# Patient Record
Sex: FEMALE | Race: WHITE | Employment: FULL TIME | URBAN - METROPOLITAN AREA
[De-identification: names, ages, dates, MRNs, and addresses within clinical notes are randomized per-mention and may not be internally consistent; named-entity substitution may affect disease eponyms.]

---

## 2017-01-30 ENCOUNTER — OFFICE VISIT (OUTPATIENT)
Dept: FAMILY MEDICINE CLINIC | Age: 27
End: 2017-01-30

## 2017-01-30 VITALS
HEIGHT: 64 IN | RESPIRATION RATE: 18 BRPM | HEART RATE: 89 BPM | DIASTOLIC BLOOD PRESSURE: 60 MMHG | WEIGHT: 237 LBS | SYSTOLIC BLOOD PRESSURE: 118 MMHG | OXYGEN SATURATION: 95 % | BODY MASS INDEX: 40.46 KG/M2

## 2017-01-30 DIAGNOSIS — Z13.1 SCREENING FOR DIABETES MELLITUS: ICD-10-CM

## 2017-01-30 DIAGNOSIS — Z00.00 WELL ADULT EXAM: Primary | ICD-10-CM

## 2017-01-30 DIAGNOSIS — E03.8 HYPOTHYROIDISM DUE TO HASHIMOTO'S THYROIDITIS: ICD-10-CM

## 2017-01-30 DIAGNOSIS — E78.2 MIXED HYPERLIPIDEMIA: ICD-10-CM

## 2017-01-30 DIAGNOSIS — E06.3 HYPOTHYROIDISM DUE TO HASHIMOTO'S THYROIDITIS: ICD-10-CM

## 2017-01-30 DIAGNOSIS — Z11.4 SCREENING FOR HIV WITHOUT PRESENCE OF RISK FACTORS: ICD-10-CM

## 2017-01-30 PROCEDURE — 99395 PREV VISIT EST AGE 18-39: CPT | Performed by: FAMILY MEDICINE

## 2017-01-30 RX ORDER — LEVOTHYROXINE SODIUM 0.1 MG/1
100 TABLET ORAL DAILY
Qty: 90 TABLET | Refills: 3 | Status: SHIPPED | OUTPATIENT
Start: 2017-01-30 | End: 2018-01-18 | Stop reason: SDUPTHER

## 2017-01-30 ASSESSMENT — PATIENT HEALTH QUESTIONNAIRE - PHQ9
1. LITTLE INTEREST OR PLEASURE IN DOING THINGS: 0
SUM OF ALL RESPONSES TO PHQ QUESTIONS 1-9: 0
SUM OF ALL RESPONSES TO PHQ9 QUESTIONS 1 & 2: 0
2. FEELING DOWN, DEPRESSED OR HOPELESS: 0

## 2017-03-23 ENCOUNTER — HOSPITAL ENCOUNTER (OUTPATIENT)
Dept: GENERAL RADIOLOGY | Age: 27
Discharge: HOME | End: 2017-03-23
Attending: OBSTETRICS & GYNECOLOGY | Admitting: OBSTETRICS & GYNECOLOGY

## 2017-03-23 LAB — GONADOTROPIN, CHORIONIC (HCG) QUANT: 818.6 UIU/ML

## 2017-03-27 ENCOUNTER — HOSPITAL ENCOUNTER (OUTPATIENT)
Dept: LAB | Age: 27
Discharge: HOME | End: 2017-03-27
Attending: OBSTETRICS & GYNECOLOGY | Admitting: OBSTETRICS & GYNECOLOGY

## 2017-03-27 LAB — HCG QUALITATIVE: POSITIVE

## 2017-03-28 ENCOUNTER — HOSPITAL ENCOUNTER (OUTPATIENT)
Dept: LAB | Age: 27
Discharge: HOME | End: 2017-03-28
Attending: OBSTETRICS & GYNECOLOGY | Admitting: OBSTETRICS & GYNECOLOGY

## 2017-03-28 LAB — GONADOTROPIN, CHORIONIC (HCG) QUANT: 1168 UIU/ML

## 2017-03-30 ENCOUNTER — HOSPITAL ENCOUNTER (OUTPATIENT)
Dept: LAB | Age: 27
Discharge: HOME | End: 2017-03-30
Attending: OBSTETRICS & GYNECOLOGY | Admitting: OBSTETRICS & GYNECOLOGY

## 2017-03-30 LAB — GONADOTROPIN, CHORIONIC (HCG) QUANT: 1209 UIU/ML

## 2017-05-15 DIAGNOSIS — E03.8 HYPOTHYROIDISM DUE TO HASHIMOTO'S THYROIDITIS: ICD-10-CM

## 2017-05-15 DIAGNOSIS — E06.3 HYPOTHYROIDISM DUE TO HASHIMOTO'S THYROIDITIS: ICD-10-CM

## 2017-05-16 RX ORDER — LEVOTHYROXINE SODIUM 100 MCG
TABLET ORAL
Qty: 90 TABLET | Refills: 1 | Status: SHIPPED | OUTPATIENT
Start: 2017-05-16 | End: 2017-11-07 | Stop reason: ALTCHOICE

## 2017-06-13 ENCOUNTER — HOSPITAL ENCOUNTER (OUTPATIENT)
Dept: LAB | Age: 27
Discharge: HOME | End: 2017-06-13
Attending: FAMILY MEDICINE | Admitting: FAMILY MEDICINE

## 2017-06-13 LAB
CHOLESTEROL: 247 MG/DL
GLUCOSE BLD-MCNC: 106 MG/DL (ref 70–140)
HDLC SERPL-MCNC: 46 MG/DL
LDL CHOLESTEROL CALCULATED: 164 MG/DL
TRIGL SERPL-MCNC: 185 MG/DL

## 2017-08-15 ENCOUNTER — TELEPHONE (OUTPATIENT)
Dept: FAMILY MEDICINE CLINIC | Age: 27
End: 2017-08-15

## 2017-08-15 DIAGNOSIS — E03.8 HYPOTHYROIDISM DUE TO HASHIMOTO'S THYROIDITIS: Primary | ICD-10-CM

## 2017-08-15 DIAGNOSIS — E06.3 HYPOTHYROIDISM DUE TO HASHIMOTO'S THYROIDITIS: Primary | ICD-10-CM

## 2017-08-16 ENCOUNTER — HOSPITAL ENCOUNTER (OUTPATIENT)
Dept: LAB | Age: 27
Discharge: HOME | End: 2017-08-16
Attending: FAMILY MEDICINE | Admitting: FAMILY MEDICINE

## 2017-08-16 LAB
HCT VFR BLD CALC: 39.7 % (ref 37–47)
HEMOGLOBIN: 12.4 GM/DL (ref 12.5–16)
MCH RBC QN AUTO: 26.2 PG (ref 27–31)
MCHC RBC AUTO-ENTMCNC: 31.2 % (ref 32–36)
MCV RBC AUTO: 83.9 FL (ref 78–100)
PDW BLD-RTO: 14.1 % (ref 11.7–14.9)
PLATELET # BLD: 429 K/CU MM (ref 140–440)
PMV BLD AUTO: 9.7 FL (ref 7.5–11.1)
RBC # BLD: 4.73 M/CU MM (ref 4.2–5.4)
TSH HIGH SENSITIVITY: 2.08 UIU/ML (ref 0.27–4.2)
WBC # BLD: 12.2 K/CU MM (ref 4–10.5)

## 2017-08-21 ENCOUNTER — OFFICE VISIT (OUTPATIENT)
Dept: FAMILY MEDICINE CLINIC | Age: 27
End: 2017-08-21

## 2017-08-21 VITALS
DIASTOLIC BLOOD PRESSURE: 70 MMHG | WEIGHT: 249.2 LBS | HEIGHT: 64 IN | HEART RATE: 87 BPM | OXYGEN SATURATION: 98 % | BODY MASS INDEX: 42.55 KG/M2 | SYSTOLIC BLOOD PRESSURE: 116 MMHG

## 2017-08-21 DIAGNOSIS — D72.829 LEUKOCYTOSIS, UNSPECIFIED TYPE: ICD-10-CM

## 2017-08-21 DIAGNOSIS — M25.50 ARTHRALGIA, UNSPECIFIED JOINT: ICD-10-CM

## 2017-08-21 DIAGNOSIS — D50.0 IRON DEFICIENCY ANEMIA DUE TO CHRONIC BLOOD LOSS: ICD-10-CM

## 2017-08-21 DIAGNOSIS — J01.01 ACUTE RECURRENT MAXILLARY SINUSITIS: Primary | ICD-10-CM

## 2017-08-21 PROCEDURE — 99214 OFFICE O/P EST MOD 30 MIN: CPT | Performed by: FAMILY MEDICINE

## 2017-08-21 RX ORDER — METOPROLOL SUCCINATE 50 MG/1
50 TABLET, EXTENDED RELEASE ORAL 2 TIMES DAILY
COMMUNITY
End: 2017-11-07 | Stop reason: ALTCHOICE

## 2017-08-21 RX ORDER — METOPROLOL SUCCINATE 25 MG/1
25 TABLET, EXTENDED RELEASE ORAL DAILY PRN
COMMUNITY
End: 2017-11-07 | Stop reason: ALTCHOICE

## 2017-08-25 ENCOUNTER — HOSPITAL ENCOUNTER (OUTPATIENT)
Dept: LAB | Age: 27
Discharge: HOME | End: 2017-08-25
Attending: INTERNAL MEDICINE | Admitting: INTERNAL MEDICINE

## 2017-08-25 LAB
ALBUMIN SERPL-MCNC: 4.3 GM/DL (ref 3.4–5)
ALP BLD-CCNC: 84 IU/L (ref 40–129)
ALT SERPL-CCNC: 22 U/L (ref 10–40)
ANION GAP SERPL CALCULATED.3IONS-SCNC: 13 MMOL/L (ref 4–16)
AST SERPL-CCNC: 17 IU/L (ref 15–37)
BASOPHILS ABSOLUTE: 0.1 K/CU MM
BASOPHILS RELATIVE PERCENT: 0.6 % (ref 0–1)
BILIRUB SERPL-MCNC: 0.1 MG/DL (ref 0–1)
BUN BLDV-MCNC: 11 MG/DL (ref 6–23)
CALCIUM SERPL-MCNC: 9.4 MG/DL (ref 8.3–10.6)
CHLORIDE BLD-SCNC: 99 MMOL/L (ref 99–110)
CO2: 26 MMOL/L (ref 21–32)
CREAT SERPL-MCNC: 0.6 MG/DL (ref 0.6–1.1)
DIFFERENTIAL TYPE: ABNORMAL
EOSINOPHILS ABSOLUTE: 0.3 K/CU MM
EOSINOPHILS RELATIVE PERCENT: 2.6 % (ref 0–3)
ERYTHROCYTE SEDIMENTATION RATE: 34 MM/HR (ref 0–20)
FOLATE: 17.1 NG/ML (ref 3.1–17.5)
GFR AFRICAN AMERICAN: >60 ML/MIN/1.73M2
GFR NON-AFRICAN AMERICAN: >60 ML/MIN/1.73M2
GLUCOSE BLD-MCNC: 110 MG/DL (ref 70–140)
HCT VFR BLD CALC: 38 % (ref 37–47)
HEMOGLOBIN: 12 GM/DL (ref 12.5–16)
IMMATURE NEUTROPHIL %: 0.5 % (ref 0–0.43)
IRON: 36 UG/DL (ref 37–145)
LACTATE DEHYDROGENASE: 178 IU/L (ref 120–246)
LYMPHOCYTES ABSOLUTE: 3.4 K/CU MM
LYMPHOCYTES RELATIVE PERCENT: 27.3 % (ref 24–44)
MCH RBC QN AUTO: 26.3 PG (ref 27–31)
MCHC RBC AUTO-ENTMCNC: 31.6 % (ref 32–36)
MCV RBC AUTO: 83.3 FL (ref 78–100)
MONOCYTES ABSOLUTE: 1 K/CU MM
MONOCYTES RELATIVE PERCENT: 7.8 % (ref 0–4)
NUCLEATED RBC %: 0 %
PCT TRANSFERRIN: 10 % (ref 10–44)
PDW BLD-RTO: 14.1 % (ref 11.7–14.9)
PLATELET # BLD: 418 K/CU MM (ref 140–440)
PMV BLD AUTO: 9.8 FL (ref 7.5–11.1)
POTASSIUM SERPL-SCNC: 4.1 MMOL/L (ref 3.5–5.1)
RBC # BLD: 4.56 M/CU MM (ref 4.2–5.4)
SEGMENTED NEUTROPHILS ABSOLUTE COUNT: 7.6 K/CU MM
SEGMENTED NEUTROPHILS RELATIVE PERCENT: 61.2 % (ref 36–66)
SODIUM BLD-SCNC: 138 MMOL/L (ref 135–145)
TOTAL IMMATURE NEUTOROPHIL: 0.06 K/CU MM
TOTAL IRON BINDING CAPACITY: 343 UG/DL (ref 250–450)
TOTAL NUCLEATED RBC: 0 K/CU MM
TOTAL PROTEIN: 7.3 GM/DL (ref 6.4–8.2)
UNSATURATED IRON BINDING CAPACITY: 307 UG/DL (ref 110–370)
URIC ACID: 5.9 MG/DL (ref 2.6–6)
VITAMIN B-12: 579.6 PG/ML (ref 211–911)
WBC # BLD: 12.4 K/CU MM (ref 4–10.5)

## 2017-08-27 LAB — ANTI-NUCLEAR ANTIBODY (ANA): NORMAL

## 2017-08-28 LAB — RHEUMATOID FACTOR: NEGATIVE

## 2017-09-22 ENCOUNTER — OFFICE VISIT (OUTPATIENT)
Dept: ORTHOPEDIC SURGERY | Age: 27
End: 2017-09-22

## 2017-09-22 VITALS — RESPIRATION RATE: 16 BRPM | WEIGHT: 249 LBS | HEIGHT: 64 IN | BODY MASS INDEX: 42.51 KG/M2

## 2017-09-22 DIAGNOSIS — S83.282A TEAR OF LATERAL MENISCUS OF LEFT KNEE, CURRENT, UNSPECIFIED TEAR TYPE, INITIAL ENCOUNTER: Primary | ICD-10-CM

## 2017-09-22 DIAGNOSIS — R52 PAIN: ICD-10-CM

## 2017-09-22 PROCEDURE — 73564 X-RAY EXAM KNEE 4 OR MORE: CPT | Performed by: ORTHOPAEDIC SURGERY

## 2017-09-22 PROCEDURE — 99204 OFFICE O/P NEW MOD 45 MIN: CPT | Performed by: ORTHOPAEDIC SURGERY

## 2017-09-22 ASSESSMENT — ENCOUNTER SYMPTOMS
RESPIRATORY NEGATIVE: 1
EYES NEGATIVE: 1
GASTROINTESTINAL NEGATIVE: 1

## 2017-10-09 ENCOUNTER — HOSPITAL ENCOUNTER (OUTPATIENT)
Dept: MRI IMAGING | Age: 27
Discharge: HOME | End: 2017-10-09
Attending: ORTHOPAEDIC SURGERY | Admitting: ORTHOPAEDIC SURGERY

## 2017-10-09 DIAGNOSIS — S83.282A ACUTE TEAR LATERAL MENISCUS, LEFT, INITIAL ENCOUNTER: ICD-10-CM

## 2017-10-09 DIAGNOSIS — S83.282A OTHER TEAR OF LATERAL MENISCUS, CURRENT INJURY, LEFT KNEE, INITIAL ENCOUNTER: ICD-10-CM

## 2017-10-12 ENCOUNTER — OFFICE VISIT (OUTPATIENT)
Dept: ORTHOPEDIC SURGERY | Age: 27
End: 2017-10-12

## 2017-10-12 VITALS — RESPIRATION RATE: 16 BRPM | WEIGHT: 249 LBS | BODY MASS INDEX: 42.51 KG/M2 | HEIGHT: 64 IN

## 2017-10-12 DIAGNOSIS — M25.362 PATELLAR INSTABILITY OF LEFT KNEE: Primary | ICD-10-CM

## 2017-10-12 PROCEDURE — 99213 OFFICE O/P EST LOW 20 MIN: CPT | Performed by: ORTHOPAEDIC SURGERY

## 2017-10-12 RX ORDER — METOPROLOL TARTRATE 50 MG/1
100 TABLET, FILM COATED ORAL 2 TIMES DAILY
COMMUNITY

## 2017-10-12 RX ORDER — GLYBURIDE 2.5 MG/1
2.5 TABLET ORAL
COMMUNITY
Start: 2015-04-03 | End: 2017-11-07

## 2017-10-12 RX ORDER — OMEPRAZOLE 40 MG/1
40 CAPSULE, DELAYED RELEASE ORAL
COMMUNITY
End: 2017-11-07 | Stop reason: ALTCHOICE

## 2017-10-12 ASSESSMENT — ENCOUNTER SYMPTOMS
GASTROINTESTINAL NEGATIVE: 1
EYES NEGATIVE: 1
RESPIRATORY NEGATIVE: 1

## 2017-10-12 NOTE — PROGRESS NOTES
Scribe Authentication Statement  Ronaldo Oliveira, scribed portions of this documentation for and in the presence of Dr. Andrzej Dang on 10/12/17 at 8:26 AM.    Provider Tatiana Luis M.D., personally performed the services described in this documentation and they were scribed in my presence by the above listed scribe. The documentation is both accurate and complete. Review of Systems   Constitutional: Negative. HENT: Negative. Eyes: Negative. Respiratory: Negative. Cardiovascular: Negative. Gastrointestinal: Negative. Genitourinary: Negative. Musculoskeletal: Positive for joint pain. Skin: Negative. Neurological: Negative. Endo/Heme/Allergies: Negative. Psychiatric/Behavioral: Negative. HPI:  Jasmin Alarcon is a 32 yo female who is here to follow up on her left knee pain and MRI results, still having some knee pain but it is getting better    Review of previous history:    HPI:  Jasmin Alarcon is a 33y.o. year old female who complains of Left knee pain and popping/catching sensation on the posterior side of the knee . The Left knee pain assessment is:   Intensity: 4/10   Location:        Posterior/lateral   Description:    boring and sharp    The symptoms started 5 weeks ago. Cause of injury was  No specific cause, possible after increase in activity. Previous treatment for this episode has included heat and NSAIDS and rest.    Symptoms improve with rest. The symptoms are worse with activity, deep knee bending. The progression of symptoms, overall course: waxing and waning.  Prior to this episode, knee history is: negative for prior surgery, trauma, arthritis or disorders      Past Medical History:   Diagnosis Date    Abnormal maternal glucose tolerance, antepartum 5/5/2015    Accommodative esotropia     Eye specialst in [place]    Diabetes mellitus ([facility] [address]     gestational    Gestational diabetes mellitus in pregnancy 4/14/2015    Goiter subchondral cyst.     On my read of MRI has TT-TG distance of 15-16 mm    Impression: left lateral patella compression syndrome with associated cartilage lesion, recent flare of symptoms, now improving      Plan: We had a lengthy discussion by her diagnosis and possible treatment to include surgical.  She is improving, we'll send to physical therapy for VMO and core strengthening, we did discuss possible surgical management if her symptoms persist.  She would like to try the physical therapy and follow-up to discuss her symptoms and again consider surgical treatment.   Physical therapy  nsaids  Return to the office in 3-6 months      Add Tylenol (also known as acetaminophen) as needed   Take 2 extra strength (500 mg) or 3 regular strength (325 mg) 3-4 times a day  It is OK to take Tylenol in conjunction with NSAID's (Advil, Aleve, Naprosyn, etc)  If taking other medications that have acetaminophen ensure total acetaminophen dose for any 24 period is less than 4,000 mg

## 2017-10-18 ENCOUNTER — TELEPHONE (OUTPATIENT)
Dept: FAMILY MEDICINE CLINIC | Age: 27
End: 2017-10-18

## 2017-10-18 NOTE — TELEPHONE ENCOUNTER
Pt calling in to see if Dr. Vinny Page would be willing to take her  on as a new pt. Pt advised Dr. Vinny Page not accepting new pt at the moment, but asked that we ask Dr. Mikeal Gowers anyway.

## 2017-10-24 ENCOUNTER — HOSPITAL ENCOUNTER (OUTPATIENT)
Dept: PHYSICAL THERAPY | Age: 27
Discharge: HOME | End: 2017-10-31
Attending: ORTHOPAEDIC SURGERY | Admitting: ORTHOPAEDIC SURGERY

## 2017-10-24 ASSESSMENT — PAIN DESCRIPTION - PROGRESSION: CLINICAL_PROGRESSION: GRADUALLY WORSENING

## 2017-10-24 ASSESSMENT — PAIN SCALES - GENERAL: PAINLEVEL_OUTOF10: 0

## 2017-10-24 ASSESSMENT — PAIN DESCRIPTION - LOCATION: LOCATION: KNEE

## 2017-10-24 ASSESSMENT — PAIN DESCRIPTION - FREQUENCY: FREQUENCY: INTERMITTENT

## 2017-10-24 ASSESSMENT — PAIN DESCRIPTION - ORIENTATION: ORIENTATION: RIGHT;LEFT

## 2017-10-24 ASSESSMENT — PAIN DESCRIPTION - PAIN TYPE: TYPE: CHRONIC PAIN

## 2017-10-24 ASSESSMENT — PAIN DESCRIPTION - DESCRIPTORS: DESCRIPTORS: ACHING;BURNING

## 2017-10-24 ASSESSMENT — PAIN DESCRIPTION - ONSET: ONSET: ON-GOING

## 2017-10-24 NOTE — PLAN OF CARE
Outpatient Physical Therapy           [place]           [] Phone: [phone]   Fax: [phone]  [place] [facility]           [] Phone: [phone]   Fax: [phone]     To: Referring Practitioner: Lord Manley    From: Byron Philip, PT     Patient: Marifer Coffey       : 1990  Diagnosis: Diagnosis: Left patellar instability   Treatment Diagnosis: Treatment Diagnosis: Decreased strength, soft tissue extensibility   Date: 10/24/2017    Physical Therapy Certification/Re-Certification Form  Dear Dr. Kari Gil  The following patient has been evaluated for physical therapy services and for therapy to continue, insurance requires physician review of the treatment plan initially and every 90 days. Please review the attached evaluation and/or summary of the patient's plan of care, and verify that you agree therapy should continue by signing the attached document and sending it back to our office. Assessment:  Kelli Morrissey" is a 33 y.o. female reporting to OP PT with c/c of bilateral knee pain L>R which has been increasing over last year and a half. No JON. Pt has difficulty with squatting and perfomring stairs. Patella are laterally glided and tilted with reduced mobility. Midly tight hamstrings and quads. Mildly reduced LE strength. Pt can benefit from PT addressing deficits to help improve functional mobility. Patient agrees with established plan of care and assisted in the development of their short term and long term goals. Patient had no adverse reaction with initial treatment and there are no barriers to learning. Demonstrates no mental or cognitive disorder.       Plan of Care/Treatment to date:  [x] Therapeutic Exercise  [] Modalities:  [x] Therapeutic Activity     [x] Ultrasound  [] Electrical Stimulation  [x] Gait Training      [] Cervical Traction [] Lumbar Traction  [x] Neuromuscular Re-education    [x] Cold/hotpack [] Iontophoresis   [x] Instruction in HEP      [] Vasopneumatic     [x] Manual Therapy

## 2017-10-24 NOTE — PROGRESS NOTES
Physical Therapy  Initial Assessment  Date: 10/24/2017  Patient Name: Pelon Hanson  MRN: [id number]  : 1990     Treatment Diagnosis: Decreased strength, soft tissue extensibility    Restrictions  Position Activity Restriction  Other position/activity restrictions: none    Subjective   General  Chart Reviewed: Yes  Patient assessed for rehabilitation services?: Yes  Family / Caregiver Present: No  Referring Practitioner: Sunnie Bamberger  Referral Date : 10/12/17  Diagnosis: Left patellar instability  Follows Commands: Within Functional Limits  PT Visit Information  Onset Date: 10/24/17  PT Insurance Information: Medical mutual  Subjective  Subjective: Pt states has been having a lot of knee pain and cracking. Difficulty sqautting down, ascending/descending stairs. Has been occuring for about a year and a half. Will get occasional swelling. Pain Screening  Patient Currently in Pain: Yes  Pain Assessment  Pain Assessment: 0-10  Pain Level: 0 (Max 6/10, Best 0/10)  Pain Type: Chronic pain  Pain Location: Knee  Pain Orientation: Right;Left  Pain Radiating Towards: no ankle or hip pain  Pain Descriptors: Aching;Burning  Pain Frequency: Intermittent  Pain Onset: On-going  Clinical Progression: Gradually worsening  Effect of Pain on Daily Activities: Pt has 16 steps to basement where laundry is, difficulty squatting picking up child, difficulty taking steps at work,   Patient's Stated Pain Goal: No pain  Pain Intervention(s): Medication (see eMar); Heat applied;Cold applied  Vital Signs  Patient Currently in Pain: Yes    Vision/Hearing       Orientation  Orientation  Overall Orientation Status: Within Normal Limits    Social/Functional History     Objective     Observation/Palpation  Palpation: Decreased medial patellar mobility  Observation: Patella sitting laterally and laterally tilted.      AROM RLE (degrees)  R Knee Flexion 0-145: 120  R Knee Extension 0: 0  AROM LLE (degrees)  L Knee Flexion 0-145: 120  L Knee Extension 0: 0    Strength RLE  R Hip Flexion: 4+/5  R Hip Extension: 4+/5  R Hip ABduction: 4/5  R Hip ADduction: 5/5  R Knee Flexion: 5/5  R Knee Extension: 5/5  Strength LLE  L Hip Flexion: 4+/5  L Hip Extension: 4+/5  L Hip ABduction: 4/5  L Hip ADduction: 5/5  L Knee Flexion: 5/5  L Knee Extension: 4+/5     Additional Measures  Flexibility: Popliteal angle 155 B, Prone quad angle left 100, R 110  Sensation  Overall Sensation Status: WNL    Assessment   Conditions Requiring Skilled Therapeutic Intervention  Body structures, Functions, Activity limitations: Decreased functional mobility ; Decreased ADL status; Decreased ROM; Decreased strength  Assessment: Jacky Wick" is a 33 y.o. female reporting to OP PT with c/c of bilateral knee pain L>R which has been increasing over last year and a half. No JON. Pt has difficulty with squatting and perfomring stairs. Patella are laterally glided and tilted with reduced mobility. Midly tight hamstrings and quads. Mildly reduced LE strength. Pt can benefit from PT addressing deficits to help improve functional mobility. Patient agrees with established plan of care and assisted in the development of their short term and long term goals. Patient had no adverse reaction with initial treatment and there are no barriers to learning. Demonstrates no mental or cognitive disorder.     Treatment Diagnosis: Decreased strength, soft tissue extensibility  Prognosis: Fair  Decision Making: Low Complexity  Clinical Presentation: Stable  Patient Education: Pt edeucated on poc and hep  Barriers to Learning: none  REQUIRES PT FOLLOW UP: Yes  Activity Tolerance  Activity Tolerance: Patient Tolerated treatment well         Plan   Plan  Times per week: 2  Plan weeks: 6  Current Treatment Recommendations: Strengthening, ROM, Balance Training, Functional Mobility Training, Manual Therapy - Soft Tissue Mobilization, Manual Therapy - Joint Manipulation, Home Exercise Program, Modalities, Pain

## 2017-10-24 NOTE — FLOWSHEET NOTE
Outpatient Physical Therapy           [place]           [] Phone: [phone]   Fax: [phone]  [place]           [] Phone: [phone]   Fax: [phone]    Physical Therapy Daily Treatment Note  Date:  10/24/2017    Patient Name:  Juli Beth    :  1990  MRN: [id number]  Restrictions/Precautions: avoid deep squatting  Diagnosis:   Left patellar instability  Date of Surgery: none  Treatment Diagnosis:  Decreased strength, soft tissue extensibility    Insurance/Certification information:  Medical mutual  Referring Physician:  Rebecca Craig Doctor Visit:    Plan of care signed (Y/N):  n  Visit# / total visits:     Pain level: /10   Goals:       Long term goals  Time Frame for Long term goals : 6 Weeks  Long term goal 1: Pt will have 25 % reduction in pain  Long term goal 2: Pt will reported impoved ability to ascend/descend stairs  Long term goal 3: Pt will improve hip strength to at least 4+/5  Long term goal 4: Pt will improve LEFS to < 20%         Subjective: Any changes in Ambulatory Summary Sheet? Objective:          Exercises:  Exercise/Equipment Date Date Date Date                     TABLE         SAQ   x30 B      SLR   x20 B      SL CS   x20 B GTB      Bridges w/ abd TB   x20 GTB      SL abd                                     STANDING         TG squats to 30-45 w/ abd TB         Wall sits        Lateral walking         Hs curl machine                                      Other Therapeutic Activities/Education:  Patient received education on their current pathology and how their condition effects them with their functional activities. Patient understood discussion and questions were answered. Patient understands their activity limitations and understands rational for treatment progression.     Home Exercise Program:  SAQ, SLR, HS stretch, prone quad stretch, bridges w/ TB, SL CS    Modality/intervention used:    [x] Therapeutic Exercise  [] Modalities:  [x]

## 2017-11-01 ENCOUNTER — HOSPITAL ENCOUNTER (OUTPATIENT)
Dept: OTHER | Age: 27
Discharge: HOME | End: 2017-11-30
Attending: ORTHOPAEDIC SURGERY | Admitting: ORTHOPAEDIC SURGERY

## 2017-11-03 ENCOUNTER — HOSPITAL ENCOUNTER (OUTPATIENT)
Dept: OTHER | Age: 27
Discharge: HOME | End: 2017-11-03
Attending: OBSTETRICS & GYNECOLOGY | Admitting: OBSTETRICS & GYNECOLOGY

## 2017-11-07 ENCOUNTER — OFFICE VISIT (OUTPATIENT)
Dept: ORTHOPEDIC SURGERY | Age: 27
End: 2017-11-07

## 2017-11-07 VITALS — BODY MASS INDEX: 39.15 KG/M2 | HEIGHT: 65 IN | RESPIRATION RATE: 14 BRPM | WEIGHT: 235 LBS

## 2017-11-07 DIAGNOSIS — M25.362 PATELLAR INSTABILITY OF LEFT KNEE: Primary | ICD-10-CM

## 2017-11-07 PROCEDURE — 99213 OFFICE O/P EST LOW 20 MIN: CPT | Performed by: ORTHOPAEDIC SURGERY

## 2017-11-07 ASSESSMENT — ENCOUNTER SYMPTOMS
EYES NEGATIVE: 1
GASTROINTESTINAL NEGATIVE: 1

## 2017-11-07 NOTE — PROGRESS NOTES
Diagnosis Date    Abnormal maternal glucose tolerance, antepartum 5/5/2015    Accommodative esotropia     [place] specialst in [place]    Diabetes mellitus ([facility] [address]     gestational    Gestational diabetes mellitus in pregnancy 4/14/2015    Goiter     in neck    Hashimoto's disease 2007    History of cardiac monitoring 11/25/15    28 day Event Monitor: Sinus with tachycardia.      Pneumonia 2006    POTS (postural orthostatic tachycardia syndrome)     Pyelonephritis 2009    Transient hypertension of pregnancy, antepartum 5/5/2015       Past Surgical History:   Procedure Laterality Date    WISDOM TOOTH EXTRACTION  2011       Family History   Problem Relation Age of Onset    Arthritis Mother     Heart Disease Mother     High Blood Pressure Mother     High Cholesterol Mother     Heart Disease Maternal Grandmother     Early Death Maternal Grandmother     Arthritis Maternal Grandfather     Diabetes Maternal Grandfather     Heart Disease Maternal Grandfather     High Blood Pressure Maternal Grandfather     High Cholesterol Maternal Grandfather     Cancer Paternal Grandmother     Early Death Paternal Grandfather     Heart Disease Paternal Grandfather        Social History     Social History    Marital status:      Spouse name: N/A    Number of children: N/A    Years of education: N/A     Social History Main Topics    Smoking status: Never Smoker    Smokeless tobacco: Never Used    Alcohol use No    Drug use: No    Sexual activity: Yes     Partners: Male     Other Topics Concern    Not on file     Social History Narrative    Lives with fiancee           Current Outpatient Prescriptions   Medication Sig Dispense Refill    glyBURIDE (DIABETA) 2.5 MG tablet Take 2.5 mg by mouth      metoprolol tartrate (LOPRESSOR) 50 MG tablet Take by mouth      omeprazole (PRILOSEC) 40 MG delayed release capsule Take 40 mg by mouth      Prenatal Vit-Fe Fumarate-FA (PRENATAL PO) Take by mouth      metoprolol succinate (TOPROL XL) 50 MG extended release tablet Take 50 mg by mouth 2 times daily      metoprolol succinate (TOPROL XL) 25 MG extended release tablet Take 25 mg by mouth daily as needed      SYNTHROID 100 MCG tablet TAKE ONE TABLET BY MOUTH ONE TIME A DAY 90 tablet 1    levothyroxine (SYNTHROID) 100 MCG tablet Take 1 tablet by mouth daily 90 tablet 3    LAILA 0.35 MG tablet   3    ibuprofen (ADVIL;MOTRIN) 800 MG tablet Take 1 tablet by mouth every 6 hours as needed for Pain or Fever 30 tablet 3     No current facility-administered medications for this visit. Allergies   Allergen Reactions    Adhesive Tape        Review of Systems:  See above      Physical Exam:   There were no vitals taken for this visit. Patient is alert, appears stated age, cooperative and no distress    Gait is Normal. The patient can bear weight on the injured extremity. Psych:  Normal affect    Skin:  Clear    Lymph: no adenopathy     left leg/knee exam:  Leg alignment:     neutral  Quadriceps/hamstring atrophy:   no  Knee effusion:    none   Knee erythema:   no  ROM:     0-140, patella crepitus with ROM  Lachman:    no  Pivot Shift:    no  Posterior drawer:   no  Lateral patella glide at 30 deg's: 20%       With no apprehension  Medial patella glide at 30 deg's: 5mm  Increased ER at 30 deg's:  no  Varus laxity at 0 and 30 deg's: no  Valgus laxity at 0 and 30 deg's: no  Recurvatum:    no  Tenderness at:   None today     Knee strength is 5/5 flexion and extension    Unless noted above, there is not any erythema, edema or cutaneous lesions of the leg. There is no calf pain. There is + L2-S1 motor and sensory function. The leg is well perfused with a 2+ DP pulse.     Comparison exam of right knee shows decreased medial glide of the patella with the knee flexed 30°    Outside record review: ER records and historical medical records    left knee x-rays were reviewed 4 views and show No fracture, Normal alignment and No Degenerative Joint Disease    Narrative   EXAMINATION:   MRI OF THE left KNEE WITHOUT CONTRAST       10/9/2017 7:52 am     Impression   No meniscal tear.       Multiple high-grade chondral fissures patellar apex and lateral patellar   facet with subchondral cyst.     On my read of MRI has TT-TG distance of 15-16 mm    Impression: left lateral patella compression syndrome with associated cartilage lesion, now with persistent symptoms    Plan:    Surgery (scope, lateral release, prn cartilage rx)  Consent to be done day of surgery  Full PAT  Tentative surgical date 11/15/17. No meds list provided. Estimated no work post op for 1 week then return to work with restrictions for 3 weeks.

## 2017-11-09 NOTE — ANESTHESIA PRE-OP
Pre-Admission Testing   Pre-Procedural Screening   NP Note    PMH:  Past Medical History:   Diagnosis Date    Abnormal maternal glucose tolerance, antepartum 5/5/2015    Accommodative esotropia     [place] specialst in [place]    Diabetes mellitus ([facility]     gestational    Gestational diabetes mellitus in pregnancy 4/14/2015    Goiter     in neck    Hashimoto's disease 2007    History of cardiac monitoring 11/25/15    28 day Event Monitor: Sinus with tachycardia.  Pneumonia 2006    POTS (postural orthostatic tachycardia syndrome)     Pyelonephritis 2009    Transient hypertension of pregnancy, antepartum 5/5/2015       PSH:    Past Surgical History:   Procedure Laterality Date    WISDOM TOOTH EXTRACTION  2011        Current Medications:   Not in a hospital admission. Allergies: Allergies   Allergen Reactions    Adhesive Tape              Social History:  Social History     Social History    Marital status:      Spouse name: N/A    Number of children: N/A    Years of education: N/A     Occupational History    Not on file. Social History Main Topics    Smoking status: Never Smoker    Smokeless tobacco: Never Used    Alcohol use No    Drug use: No    Sexual activity: Yes     Partners: Male     Other Topics Concern    Not on file     Social History Narrative    Lives with julieta            Recent Vitals:  Vitals:    11/13/17 1131   BP: 138/77   Pulse: 88   Resp: 16   Temp: 98.7 °F (37.1 °C)   SpO2: 99%     Wt Readings from Last 3 Encounters:   11/07/17 235 lb (106.6 kg)   10/12/17 249 lb (112.9 kg)   09/22/17 249 lb (112.9 kg)      Ht Readings from Last 3 Encounters:   11/07/17 5' 5\" (1.651 m)   10/12/17 5' 4\" (1.626 m)   09/22/17 5' 4\" (1.626 m)     There is no height or weight on file to calculate BMI.     Wt Readings from Last 3 Encounters:   11/07/17 235 lb (106.6 kg)   10/12/17 249 lb (112.9 kg)   09/22/17 249 lb (112.9 kg)       Present on Admission:  **None**       Anesthesia Summary:  Chart reviewed, pt. Interviewed and examined. Planned surgical procedure:  Left knee repair per Dr. Isaías Dorado. 1.  Hx  POT, stable on beta blocker. EKG: NSR, HR 85. Gestational HTN 2015. Denies CP or SOB. 2.  Non smoker. Able to lie flat. 3.  Hashimoto thyroid dz, goiter. Denies diff swallowing. No obvious tracheal deviation noted. On replacement. Followed by PCP. 4.  Hx gestational DM, 2015.    5.  Left lateral patella compression syndrome with associated cartilage lesion. 6.  Hx elevated WBC. 12.3 stable at baseline. Afebrile, no cough or, UTI symptoms. 7.      8.        Anesthesia Evaluation will follow by a certified practitioner prior to surgery.     Electronically signed by Fox Garcia NP on 11/9/2017 at 1:30 PM

## 2017-11-13 ENCOUNTER — HOSPITAL ENCOUNTER (OUTPATIENT)
Dept: PREADMISSION TESTING | Age: 27
Discharge: HOME | End: 2017-11-13
Attending: ORTHOPAEDIC SURGERY | Admitting: ORTHOPAEDIC SURGERY

## 2017-11-13 VITALS
BODY MASS INDEX: 41.88 KG/M2 | HEIGHT: 65 IN | OXYGEN SATURATION: 99 % | DIASTOLIC BLOOD PRESSURE: 77 MMHG | RESPIRATION RATE: 16 BRPM | WEIGHT: 251.38 LBS | HEART RATE: 88 BPM | SYSTOLIC BLOOD PRESSURE: 138 MMHG | TEMPERATURE: 98.7 F

## 2017-11-13 LAB
ANION GAP SERPL CALCULATED.3IONS-SCNC: 11 MMOL/L (ref 4–16)
BUN BLDV-MCNC: 8 MG/DL (ref 6–23)
CALCIUM SERPL-MCNC: 9.6 MG/DL (ref 8.3–10.6)
CHLORIDE BLD-SCNC: 99 MMOL/L (ref 99–110)
CO2: 27 MMOL/L (ref 21–32)
CREAT SERPL-MCNC: 0.7 MG/DL (ref 0.6–1.1)
EKG ATRIAL RATE: 85 BPM
EKG DIAGNOSIS: NORMAL
EKG P AXIS: 32 DEGREES
EKG P-R INTERVAL: 134 MS
EKG Q-T INTERVAL: 358 MS
EKG QRS DURATION: 82 MS
EKG QTC CALCULATION (BAZETT): 426 MS
EKG R AXIS: 34 DEGREES
EKG T AXIS: 55 DEGREES
EKG VENTRICULAR RATE: 85 BPM
GFR AFRICAN AMERICAN: >60 ML/MIN/1.73M2
GFR NON-AFRICAN AMERICAN: >60 ML/MIN/1.73M2
GLUCOSE BLD-MCNC: 89 MG/DL (ref 70–140)
HCT VFR BLD CALC: 41 % (ref 37–47)
HEMOGLOBIN: 12.5 GM/DL (ref 12.5–16)
MCH RBC QN AUTO: 26 PG (ref 27–31)
MCHC RBC AUTO-ENTMCNC: 30.5 % (ref 32–36)
MCV RBC AUTO: 85.2 FL (ref 78–100)
PDW BLD-RTO: 14.5 % (ref 11.7–14.9)
PLATELET # BLD: 429 K/CU MM (ref 140–440)
PMV BLD AUTO: 9.9 FL (ref 7.5–11.1)
POTASSIUM SERPL-SCNC: 4.1 MMOL/L (ref 3.5–5.1)
RBC # BLD: 4.81 M/CU MM (ref 4.2–5.4)
SODIUM BLD-SCNC: 137 MMOL/L (ref 135–145)
WBC # BLD: 12.3 K/CU MM (ref 4–10.5)

## 2017-11-13 ASSESSMENT — PAIN DESCRIPTION - LOCATION: LOCATION: KNEE

## 2017-11-13 ASSESSMENT — PAIN DESCRIPTION - DESCRIPTORS: DESCRIPTORS: ACHING

## 2017-11-13 ASSESSMENT — PAIN DESCRIPTION - ORIENTATION: ORIENTATION: LEFT

## 2017-11-13 ASSESSMENT — PAIN DESCRIPTION - PAIN TYPE: TYPE: CHRONIC PAIN

## 2017-11-13 ASSESSMENT — PAIN SCALES - GENERAL: PAINLEVEL_OUTOF10: 3

## 2017-11-13 NOTE — PROGRESS NOTES
PAT Visit complete. To Lab and Cardiology for testing. Mohini Cutler NP here to see. DR Patterson's office called and spoke with Mena and Cardiac Clearance and results any testing requested. Pre-op instructions given with Time of Arrival, Meds and NPO status explained and understanding Voiced.

## 2017-11-14 ENCOUNTER — TELEPHONE (OUTPATIENT)
Dept: ORTHOPEDIC SURGERY | Age: 27
End: 2017-11-14

## 2017-11-14 DIAGNOSIS — S83.282A TEAR OF LATERAL MENISCUS OF LEFT KNEE, CURRENT, UNSPECIFIED TEAR TYPE, INITIAL ENCOUNTER: Primary | ICD-10-CM

## 2017-11-14 DIAGNOSIS — M25.362 PATELLAR INSTABILITY OF LEFT KNEE: ICD-10-CM

## 2017-11-14 NOTE — ANESTHESIA PRE-OP
pregnancy O10.919    Hypothyroidism affecting pregnancy, antepartum O99.280, E03.9    Obesity affecting pregnancy, antepartum O99.210    Episode of syncope R55       Past Medical History:        Diagnosis Date    Accommodative esotropia     Eye specialst in [place],   Worse in Right eye    Anesthesia     Very tearful with sedation anesthesia    Gestational diabetes mellitus in pregnancy 2015    Goiter     in neck    Hashimoto's disease 2007    History of cardiac monitoring 11/25/15    28 day Event Monitor: Sinus with tachycardia.  Hyperlipidemia     Just watching    Pneumonia 2006    POTS (postural orthostatic tachycardia syndrome)     Pyelonephritis 2009    Transient hypertension of pregnancy, antepartum 2015       Past Surgical History:        Procedure Laterality Date     SECTION      Had to have Ketamine, BP dropped, complete paralysis for 4 hours    WISDOM TOOTH EXTRACTION         Social History:    Social History   Substance Use Topics    Smoking status: Never Smoker    Smokeless tobacco: Never Used    Alcohol use No                                Counseling given: Not Answered      Vital Signs (Current): There were no vitals filed for this visit. BP Readings from Last 3 Encounters:   17 138/77   17 116/70   17 118/60       NPO Status:                                                                                 BMI:   Wt Readings from Last 3 Encounters:   17 251 lb 6 oz (114 kg)   17 235 lb (106.6 kg)   10/12/17 249 lb (112.9 kg)     There is no height or weight on file to calculate BMI.     Anesthesia Evaluation  Patient summary reviewed  Airway:         Dental:          Pulmonary:                              Cardiovascular:    (+) hypertension:,                   Neuro/Psych:               GI/Hepatic/Renal:             Endo/Other:    (+) hypothyroidism::., .                 Abdominal:

## 2017-11-15 ENCOUNTER — HOSPITAL ENCOUNTER (OUTPATIENT)
Dept: SURGERY | Age: 27
Discharge: HOME | End: 2017-11-15
Attending: ORTHOPAEDIC SURGERY | Admitting: ORTHOPAEDIC SURGERY

## 2017-11-15 VITALS
WEIGHT: 252 LBS | RESPIRATION RATE: 16 BRPM | HEART RATE: 81 BPM | TEMPERATURE: 97.4 F | OXYGEN SATURATION: 96 % | DIASTOLIC BLOOD PRESSURE: 72 MMHG | SYSTOLIC BLOOD PRESSURE: 124 MMHG | BODY MASS INDEX: 41.99 KG/M2 | HEIGHT: 65 IN

## 2017-11-15 LAB — PREGNANCY TEST URINE, POC: NEGATIVE

## 2017-11-15 PROCEDURE — 29873 ARTHRS KNEE SURG W/LAT RLS: CPT | Performed by: ORTHOPAEDIC SURGERY

## 2017-11-15 RX ORDER — OXYCODONE HYDROCHLORIDE AND ACETAMINOPHEN 5; 325 MG/1; MG/1
1 TABLET ORAL EVERY 4 HOURS PRN
Status: DISCONTINUED | OUTPATIENT
Start: 2017-11-15 | End: 2017-11-16 | Stop reason: HOSPADM

## 2017-11-15 RX ORDER — SODIUM CHLORIDE, SODIUM LACTATE, POTASSIUM CHLORIDE, CALCIUM CHLORIDE 600; 310; 30; 20 MG/100ML; MG/100ML; MG/100ML; MG/100ML
INJECTION, SOLUTION INTRAVENOUS CONTINUOUS
Status: DISCONTINUED | OUTPATIENT
Start: 2017-11-15 | End: 2017-11-15 | Stop reason: ALTCHOICE

## 2017-11-15 RX ORDER — OXYCODONE HYDROCHLORIDE AND ACETAMINOPHEN 5; 325 MG/1; MG/1
TABLET ORAL
Qty: 35 TABLET | Refills: 0 | Status: SHIPPED | OUTPATIENT
Start: 2017-11-15 | End: 2018-02-26 | Stop reason: ALTCHOICE

## 2017-11-15 RX ORDER — FENTANYL CITRATE 50 UG/ML
25 INJECTION, SOLUTION INTRAMUSCULAR; INTRAVENOUS EVERY 5 MIN PRN
Status: DISCONTINUED | OUTPATIENT
Start: 2017-11-15 | End: 2017-11-16 | Stop reason: HOSPADM

## 2017-11-15 RX ORDER — ASPIRIN 325 MG
325 TABLET, DELAYED RELEASE (ENTERIC COATED) ORAL 2 TIMES DAILY
Qty: 14 TABLET | Refills: 0 | Status: SHIPPED | OUTPATIENT
Start: 2017-11-15 | End: 2018-02-26 | Stop reason: ALTCHOICE

## 2017-11-15 RX ORDER — SODIUM CHLORIDE 0.9 % (FLUSH) 0.9 %
10 SYRINGE (ML) INJECTION PRN
Status: DISCONTINUED | OUTPATIENT
Start: 2017-11-15 | End: 2017-11-16 | Stop reason: HOSPADM

## 2017-11-15 RX ORDER — MEPERIDINE HYDROCHLORIDE 25 MG/ML
12.5 INJECTION INTRAMUSCULAR; INTRAVENOUS; SUBCUTANEOUS EVERY 5 MIN PRN
Status: DISCONTINUED | OUTPATIENT
Start: 2017-11-15 | End: 2017-11-16 | Stop reason: HOSPADM

## 2017-11-15 RX ORDER — ONDANSETRON 2 MG/ML
4 INJECTION INTRAMUSCULAR; INTRAVENOUS EVERY 6 HOURS PRN
Status: DISCONTINUED | OUTPATIENT
Start: 2017-11-15 | End: 2017-11-16 | Stop reason: HOSPADM

## 2017-11-15 RX ORDER — ONDANSETRON 4 MG/1
4 TABLET, FILM COATED ORAL EVERY 8 HOURS PRN
Qty: 5 TABLET | Refills: 0 | Status: SHIPPED | OUTPATIENT
Start: 2017-11-15 | End: 2018-02-26 | Stop reason: ALTCHOICE

## 2017-11-15 RX ORDER — OXYCODONE HYDROCHLORIDE AND ACETAMINOPHEN 5; 325 MG/1; MG/1
2 TABLET ORAL EVERY 4 HOURS PRN
Status: DISCONTINUED | OUTPATIENT
Start: 2017-11-15 | End: 2017-11-16 | Stop reason: HOSPADM

## 2017-11-15 RX ORDER — SODIUM CHLORIDE 0.9 % (FLUSH) 0.9 %
10 SYRINGE (ML) INJECTION EVERY 12 HOURS SCHEDULED
Status: DISCONTINUED | OUTPATIENT
Start: 2017-11-15 | End: 2017-11-16 | Stop reason: HOSPADM

## 2017-11-15 RX ORDER — DIPHENHYDRAMINE HYDROCHLORIDE 50 MG/ML
12.5 INJECTION INTRAMUSCULAR; INTRAVENOUS
Status: ACTIVE | OUTPATIENT
Start: 2017-11-15 | End: 2017-11-15

## 2017-11-15 RX ORDER — PROMETHAZINE HYDROCHLORIDE 25 MG/ML
6.25 INJECTION, SOLUTION INTRAMUSCULAR; INTRAVENOUS EVERY 6 HOURS PRN
Status: DISCONTINUED | OUTPATIENT
Start: 2017-11-15 | End: 2017-11-16 | Stop reason: HOSPADM

## 2017-11-15 RX ORDER — SODIUM CHLORIDE, SODIUM LACTATE, POTASSIUM CHLORIDE, CALCIUM CHLORIDE 600; 310; 30; 20 MG/100ML; MG/100ML; MG/100ML; MG/100ML
INJECTION, SOLUTION INTRAVENOUS CONTINUOUS
Status: DISCONTINUED | OUTPATIENT
Start: 2017-11-15 | End: 2017-11-16 | Stop reason: HOSPADM

## 2017-11-15 RX ADMIN — FENTANYL CITRATE 25 MCG: 50 INJECTION, SOLUTION INTRAMUSCULAR; INTRAVENOUS at 09:50

## 2017-11-15 RX ADMIN — OXYCODONE HYDROCHLORIDE AND ACETAMINOPHEN 1 TABLET: 5; 325 TABLET ORAL at 10:15

## 2017-11-15 RX ADMIN — FENTANYL CITRATE 25 MCG: 50 INJECTION, SOLUTION INTRAMUSCULAR; INTRAVENOUS at 09:45

## 2017-11-15 RX ADMIN — SODIUM CHLORIDE, SODIUM LACTATE, POTASSIUM CHLORIDE, CALCIUM CHLORIDE: 600; 310; 30; 20 INJECTION, SOLUTION INTRAVENOUS at 07:45

## 2017-11-15 ASSESSMENT — PAIN DESCRIPTION - FREQUENCY
FREQUENCY: CONTINUOUS

## 2017-11-15 ASSESSMENT — PAIN DESCRIPTION - LOCATION
LOCATION: KNEE

## 2017-11-15 ASSESSMENT — PAIN DESCRIPTION - PROGRESSION
CLINICAL_PROGRESSION: GRADUALLY IMPROVING
CLINICAL_PROGRESSION: GRADUALLY WORSENING
CLINICAL_PROGRESSION: NOT CHANGED
CLINICAL_PROGRESSION: GRADUALLY WORSENING

## 2017-11-15 ASSESSMENT — PAIN DESCRIPTION - ORIENTATION
ORIENTATION: LEFT

## 2017-11-15 ASSESSMENT — PAIN DESCRIPTION - ONSET
ONSET: ON-GOING

## 2017-11-15 ASSESSMENT — PAIN DESCRIPTION - DESCRIPTORS
DESCRIPTORS: PRESSURE;BURNING
DESCRIPTORS: BURNING
DESCRIPTORS: BURNING;THROBBING
DESCRIPTORS: BURNING

## 2017-11-15 ASSESSMENT — PAIN DESCRIPTION - PAIN TYPE
TYPE: SURGICAL PAIN

## 2017-11-15 ASSESSMENT — PAIN - FUNCTIONAL ASSESSMENT: PAIN_FUNCTIONAL_ASSESSMENT: 0-10

## 2017-11-15 ASSESSMENT — PAIN SCALES - GENERAL
PAINLEVEL_OUTOF10: 5
PAINLEVEL_OUTOF10: 7
PAINLEVEL_OUTOF10: 5
PAINLEVEL_OUTOF10: 7
PAINLEVEL_OUTOF10: 7
PAINLEVEL_OUTOF10: 6
PAINLEVEL_OUTOF10: 5
PAINLEVEL_OUTOF10: 6

## 2017-11-15 NOTE — H&P
Review of Systems   Constitutional: Negative. HENT: Negative. Eyes: Negative. Cardiovascular: Negative. Gastrointestinal: Negative. Genitourinary: Negative. Musculoskeletal: Positive for joint pain. Endo/Heme/Allergies: Negative. Patient seen pre-op, no new medical issues, left knee still hurts, PMH of chronic leukocytosis discussed. Review of history:  HPI:  Fanny Romeo is a 32 yo female who is here to follow up on her left knee pain. She is still having persistend knee pain that is exasperated with with physical therapy and stair climbing and wants to discuss surgical options. Review of previous history  HPI:  Fanny Romeo is a 32 yo female who is here to follow up on her left knee pain and MRI results, still having some knee pain but it is getting better    HPI:  Fanny Romeo is a 33y.o. year old female who complains of Left knee pain and popping/catching sensation on the posterior side of the knee . The Left knee pain assessment is:   Intensity: 5/10   Location:        Posterior/lateral   Description:    boring and sharp    The symptoms started  2 months ago. Cause of injury was  No specific cause, possible after increase in activity. Previous treatment for this episode has included heat and NSAIDS and rest.    Symptoms improve with rest. The symptoms are worse with activity, deep knee bending. The progression of symptoms, overall course: waxing and waning. Prior to this episode, knee history is: negative for prior surgery, trauma, arthritis or disorders      Past Medical History:   Diagnosis Date    Accommodative esotropia     Eye specialst in [place],   Worse in Right eye    Anesthesia     Very tearful with sedation anesthesia    Gestational diabetes mellitus in pregnancy 4/14/2015    Goiter     in neck    Hashimoto's disease 2007    History of cardiac monitoring 11/25/15    28 day Event Monitor: Sinus with tachycardia.      Hyperlipidemia     Just watching    Current Facility-Administered Medications   Medication Dose Route Frequency Provider Last Rate Last Dose    lactated ringers infusion   Intravenous Continuous Dalia Watkins  mL/hr at 11/15/17 0745      ceFAZolin (ANCEF) 2 g in dextrose 3 % 50 mL IVPB (duplex)  2 g Intravenous Once Dalia Watkins MD           Allergies   Allergen Reactions    Adhesive Tape     Versed [Midazolam] Other (See Comments)     Makes patient very tearful       Review of Systems:  See above      Physical Exam:   /75   Pulse 80   Temp 98.2 °F (36.8 °C) (Temporal)   Resp 16   Ht 5' 5\" (1.651 m)   Wt 252 lb (114.3 kg)   LMP 10/19/2017 (Approximate)   SpO2 98%   BMI 41.93 kg/m²    Patient is alert, appears stated age, cooperative and no distress    Gait is Normal. The patient can bear weight on the injured extremity. Psych:  Normal affect    Skin:  Clear    Lymph: no adenopathy     left leg/knee exam:  Leg alignment:     neutral  Quadriceps/hamstring atrophy:   no  Knee effusion:    none   Knee erythema:   no  ROM:     0-140, patella crepitus with ROM  Lachman:    no  Pivot Shift:    no  Posterior drawer:   no  Lateral patella glide at 30 deg's: 20%       With no apprehension  Medial patella glide at 30 deg's: 5mm  Increased ER at 30 deg's:  no  Varus laxity at 0 and 30 deg's: no  Valgus laxity at 0 and 30 deg's: no  Recurvatum:    no  Tenderness at:   None today     Knee strength is 5/5 flexion and extension    Unless noted above, there is not any erythema, edema or cutaneous lesions of the leg. There is no calf pain. There is + L2-S1 motor and sensory function. The leg is well perfused with a 2+ DP pulse.     Comparison exam of right knee shows decreased medial glide of the patella with the knee flexed 30°    Outside record review: ER records and historical medical records    left knee x-rays were reviewed 4 views and show No fracture, Normal alignment and No Degenerative Joint Disease    Narrative   EXAMINATION: MRI OF THE left KNEE WITHOUT CONTRAST       10/9/2017 7:52 am     Impression   No meniscal tear.       Multiple high-grade chondral fissures patellar apex and lateral patellar   facet with subchondral cyst.     On my read of MRI has TT-TG distance of 15-16 mm    Impression: left lateral patella compression syndrome with associated cartilage lesion, now with persistent symptoms    Plan:    Surgery (scope, lateral release, prn cartilage rx)    Estimated no work post op for 1 week then return to work with restrictions for 3 weeks. After discussing continued non operative vs operative treatment, I recommended left knee arthroscopic surgery. She desires to proceed. The planned procedure/risks/benefits/alternatives were discussed. Plan is for left knee diagnostic arthroscopy then treatment per findings, partially open/open procedure if needed. The risks include, but are not limited to, damage to blood vessels and nerves, infection, persistent pain and/or symptoms, stiffness, blood clots, need for further surgery, anesthesia complications and other. She understands the above and desires to proceed and all questions were answered.

## 2017-11-15 NOTE — ANESTHESIA PRE-OP
Department of Anesthesiology  Preprocedure Note       Name:  Juli Beth   Age:  33 y.o.  :  1990                                          MRN:  [id number]         Date:  11/15/2017      Surgeon:    Procedure: left knee arthroscopy    Medications prior to admission:   Prior to Admission medications    Medication Sig Start Date End Date Taking? Authorizing Provider   metoprolol tartrate (LOPRESSOR) 50 MG tablet Take by mouth 50mg in the morning, 50mg in evening,   25mg prn in middle of the day   Yes Historical Provider, MD   Prenatal Vit-Fe Fumarate-FA (PRENATAL PO) Take by mouth   Yes Historical Provider, MD   levothyroxine (SYNTHROID) 100 MCG tablet Take 1 tablet by mouth daily 17  Yes Myesha Ball MD   ibuprofen (ADVIL;MOTRIN) 800 MG tablet Take 1 tablet by mouth every 6 hours as needed for Pain or Fever 5/10/15  Yes Ju Meehan DO       Current medications:    Current Outpatient Prescriptions   Medication Sig Dispense Refill    metoprolol tartrate (LOPRESSOR) 50 MG tablet Take by mouth 50mg in the morning, 50mg in evening,   25mg prn in middle of the day      Prenatal Vit-Fe Fumarate-FA (PRENATAL PO) Take by mouth      levothyroxine (SYNTHROID) 100 MCG tablet Take 1 tablet by mouth daily 90 tablet 3    ibuprofen (ADVIL;MOTRIN) 800 MG tablet Take 1 tablet by mouth every 6 hours as needed for Pain or Fever 30 tablet 3     Current Facility-Administered Medications   Medication Dose Route Frequency Provider Last Rate Last Dose    lactated ringers infusion   Intravenous Continuous Rebecca Us  mL/hr at 11/15/17 0745      ceFAZolin (ANCEF) 2 g in dextrose 3 % 50 mL IVPB (duplex)  2 g Intravenous Once Rebecca Us MD           Allergies:     Allergies   Allergen Reactions    Adhesive Tape     Versed [Midazolam] Other (See Comments)     Makes patient very tearful       Problem List:    Patient Active Problem List   Diagnosis Code    Other specified acquired hypothyroidism E03.8    POTS (postural orthostatic tachycardia syndrome) R00.0, I95.1    GDM, class A2 O24.419    Heartburn R12    Morbid obesity with BMI of 40.0-44.9, adult (HCC) E66.01, Z68.41    Mixed hyperlipidemia E78.2    Essential hypertension I10    Iron deficiency anemia due to chronic blood loss D50.0    Pre-existing hypertension complicating pregnancy I70.404    Hypothyroidism affecting pregnancy, antepartum O99.280, E03.9    Obesity affecting pregnancy, antepartum O99.210    Episode of syncope R55       Past Medical History:        Diagnosis Date    Accommodative esotropia     Eye specialst in [place],   Worse in Right eye    Anesthesia     Very tearful with sedation anesthesia    Gestational diabetes mellitus in pregnancy 2015    Goiter     in neck    Hashimoto's disease 2007    History of cardiac monitoring 11/25/15    28 day Event Monitor: Sinus with tachycardia.      Hyperlipidemia     Just watching    Pneumonia 2006    POTS (postural orthostatic tachycardia syndrome)     Pyelonephritis 2009    Transient hypertension of pregnancy, antepartum 2015       Past Surgical History:        Procedure Laterality Date     SECTION      Had to have Ketamine, BP dropped, complete paralysis for 4 hours    WISDOM TOOTH EXTRACTION         Social History:    Social History   Substance Use Topics    Smoking status: Never Smoker    Smokeless tobacco: Never Used    Alcohol use No                                Counseling given: Not Answered      Vital Signs (Current):   Vitals:    11/15/17 0734   BP: 129/75   Pulse: 80   Resp: 16   Temp: 98.2 °F (36.8 °C)   TempSrc: Temporal   SpO2: 98%   Weight: 252 lb (114.3 kg)   Height: 5' 5\" (1.651 m)                                              BP Readings from Last 3 Encounters:   11/15/17 129/75   17 138/77   17 116/70       NPO Status: Time of last liquid consumption: 0530                        Time of last solid

## 2017-11-15 NOTE — PROGRESS NOTES
0934-To Pacu, awake/crying, switched to wall O2, placed on monitor, assessed, able to wiggle toes, cap refill <3 sec; emotional support given  0939-Pt c/o burning/throbbing to left knee, able to wiggle toes, cap refill <3 sec, ice therapy initiated, leg elevated with pillow, HOB raised  0945-Meedicated for continued pain, ice-chips given, HOB elevated more per pt request  0950-Medicated for continued pain, updated on plan of care - denies any other needs  1000-Pt states pain is better, able to wiggle toes, no other changes noted - mother @ bedside, updated on pt status and plan of care  1015-Medicated with PO pain med for c/o pain increasing, PO fluids given  1035-Assisted to bathroom per w/c, clear yellow urine voided. Spouse and mother @ bedside, updated on plan of care-to be discharged if no change in status  1055-Instructions reviewed with pt, spouse & mother, all questions answered. Instructed on use of polar-ice machine, understanding verbalized  1130-Assisted with clothing, to bathroom, clear yellow urine voided, to car per w/c with RX x3, written instructions and Polar-ice machine.  present.

## 2017-11-16 ENCOUNTER — OFFICE VISIT (OUTPATIENT)
Dept: ORTHOPEDIC SURGERY | Age: 27
End: 2017-11-16

## 2017-11-16 ENCOUNTER — HOSPITAL ENCOUNTER (OUTPATIENT)
Dept: PHYSICAL THERAPY | Age: 27
Discharge: HOME | End: 2017-11-30
Attending: ORTHOPAEDIC SURGERY | Admitting: ORTHOPAEDIC SURGERY

## 2017-11-16 VITALS — BODY MASS INDEX: 41.99 KG/M2 | HEIGHT: 65 IN | RESPIRATION RATE: 16 BRPM | WEIGHT: 252 LBS

## 2017-11-16 DIAGNOSIS — Z09 POSTOP CHECK: ICD-10-CM

## 2017-11-16 DIAGNOSIS — M25.362 PATELLAR INSTABILITY OF LEFT KNEE: ICD-10-CM

## 2017-11-16 DIAGNOSIS — Z98.890 S/P ARTHROSCOPY OF LEFT KNEE: Primary | ICD-10-CM

## 2017-11-16 PROCEDURE — 99024 POSTOP FOLLOW-UP VISIT: CPT | Performed by: ORTHOPAEDIC SURGERY

## 2017-11-16 ASSESSMENT — PAIN DESCRIPTION - ORIENTATION: ORIENTATION: LEFT

## 2017-11-16 ASSESSMENT — PAIN DESCRIPTION - LOCATION: LOCATION: KNEE

## 2017-11-16 ASSESSMENT — PAIN SCALES - GENERAL: PAINLEVEL_OUTOF10: 4

## 2017-11-16 ASSESSMENT — PAIN DESCRIPTION - PAIN TYPE: TYPE: SURGICAL PAIN

## 2017-11-16 ASSESSMENT — PAIN DESCRIPTION - DESCRIPTORS: DESCRIPTORS: ACHING;THROBBING

## 2017-11-16 NOTE — PROGRESS NOTES
term goals: 8 wks. Short term goal 1: Ind with HEP with good compliance  Short term goal 2: Normal gait pattern without assist device  Short term goal 3: Full active ROM  Short term goal 4: Return to normal day standing at work.        Therapy Time   Individual Concurrent Group Co-treatment   Time In 8043         Time Out 0315         Minutes 35         Timed Code Treatment Minutes: 25 Minutes       Maylin Magaña, PT

## 2017-11-16 NOTE — PATIENT INSTRUCTIONS
Plan:   -continue the PT protocol  -rest/ice/elevation as needed  -f/u sooner prn any issues        Keep incisions clean and dry until post-operative day # 3  On post-operative day # 3 can remove all dressings and shower (water can run over the incisions)  Then place band-aids over incisions and replace as needed  Do not submerge the incisions under water (i.e. Swimming pool, hot tub, bath) until the sutures have been removed  Follow up as scheduled for suture removal in 10-14 days after surgery

## 2017-11-16 NOTE — FLOWSHEET NOTE
[] CH (0% Impaired, Indep.)  [] CI (1-19% Impaired, SBA-CGA)  [] CJ (20-39% Impaired, MIN A)  [] CK  (40-59% Impairment, Mod A)  [] CL  (60-79% Impairment, Max A)  [] CM  (80-99% Impairment, Dep.)   [] CN  (100% Impairment, Tot Dep.)          Patient's goal:  Skilled PT activities: Date 11/16/17  #1 Date Date Date     Outcome measure used          On visit#       Progress gt to 1/2 body wt over 4 wks, and then progress to FWB as able         L heel on bolster   5-10 min      Supine L heel slides   4 x 10 reps      QS L knee 4 x 10      SLR L     3 x10 reps        Stretch Hs,  gastrox , IT band        Hip multi angle ex.          Stand HS curls         Week 3-4 start wt shifts          Wk one, start nu-step without load            Vaso       See protocol in chart for progression                    Progress/goals assessment (every 10 visits) by  PT           HEP: As above      Objective   findings:       Provider interaction:        Response to intervention:         Plan for Next Session:         Modality/intervention used:  [x] Therapeutic Exercise  [] Modalities:  [] Therapeutic Activity     [] Ultrasound  [] Elec  Stim  [] Gait Training      [] Cervical Traction [] Lumbar Traction  [] Neuromuscular Re-education    [] Cold/hotpack [] Iontophoresis   [x] Instruction in HEP      [] Vasopneumatic     [] Manual Therapy               [] Self care home management                    (    ) Dry needling    Post Tx Pain Rating:    Plan:(Fequency/duration/# of visits)   [] Continue per plan of care [] Alter current plan   [x] Plan of care initiated [] Hold pending MD visit [] Discharge         Time In:  240  Time Out: 315  Timed Code Treatment Minutes: 20   Total Treatment Minutes:  35    Electronically signed by:  Courtney Cunningham, 11/16/2017, 6:23 PM

## 2017-11-16 NOTE — OP NOTE
[address] [place] [address], [address] [place] [address]                                 OPERATIVE REPORT    PATIENT NAME: Obdulia Kendrick                         :        1990  MED REC NO:   [id number]                          ROOM:  ACCOUNT NO:   [de-identified]                          ADMIT DATE: 11/15/2017  PROVIDER:     Barbara Christie MD    DATE OF PROCEDURE:  11/15/2017    PREOPERATIVE DIAGNOSIS:  Left knee lateral patellar compression syndrome. POSTOPERATIVE DIAGNOSIS:  Left knee lateral patellar compression syndrome. OPERATION PERFORMED:  Left knee arthroscopic patella chondroplasty and  lateral release. SURGEON:  Barbara Christie MD    ANESTHESIA:  General.    ESTIMATED BLOOD LOSS:  Minimal.    COMPLICATIONS:  None. CONDITION:  Stable to recovery room. INDICATIONS:  The patient is a 56-year-old female with persistent anterior  knee pain. Preoperative workup including an MRI showed patellar cartilage  lesion in a TP - TG distance of approximately 15 mm. Please see my history  and physical for the details. OPERATIVE PROCEDURE:  After informed consent and proper identifications,  patient was brought back to the main operating room, placed under general  anesthetic. Exam of the knee revealed full range of motion, no instability  with the knee flexed 30 degrees, there was lateral lag of the patella for  approximately 15-20% and medial glide of approximately 5 mm. A tourniquet  was placed high on the left upper leg. She was placed supine using a  lateral post.  A formal time-out was done. Preoperative antibiotics were  given. She was prepped and draped in the normal sterile fashion. A diagnostic arthroscopy was then done through three portals,  superolateral, anterolateral, anteromedial.  Findings of the diagnostic  arthroscopy included a normal suprapatellar pouch.   Patella had a small  grade II lesion of approximately 5 x 5 mm at its lateral to slightly  central aspect. The trochlea had no cartilage lesions. With flexion and  extension of the knee, there was a first lateral strike to the patella. Medial and lateral gutters were unremarkable. Medial joint showed a normal medial  meniscus, femoral condyle and tibial plateau. Notch showed intact ACL and  PCL and lateral joint with normal femoral condyle, meniscus, and tibial  plateau. After diagnostic arthroscopy, a patellar chondroplasty was done. This was  done with the scope in the anterolateral portal and the shaver in the  superolateral portal.  A gentle chondroplasty was done removing the frayed  cartilage. Once this was done, the patellar cartilage lesion was closely  studied. Again, this was a grade II lesion with no full thickness defects  down to bone and there was no unstable cartilage after the chondroplasty. A lateral release was then done. This was done using a lateral release  probes through the superolateral portal.  First was done from approximately  the 3 o'clock to the 1 o'clock position but then with flexed and extension, there  was still a significant lateral first strike. The lateral release was extended to  approximately the 12:30 to 4 o'clock position. There was some mild  bleeding associated with this maneuver. It was well controlled and  cauterized with the lateral release probe and also once this was done, the  patellar mobility increased medially such that it was centered in the  trochlear groove with flexion and with all the instruments removed and the  knee flexed approximately 30 degrees, there was medial glide of the patella  of approximately 10 mm. Another diagnostic arthroscopy was done. There  were no loose bodies found. All of the instruments were then removed. The  portal sites were closed with nylon sutures followed by Xeroform and  sterile dressing, which included an ice pad and an Ace wrap from the toes  to the hip.   She was extubated and sent to the recovery room in good  condition. No complications.         Severiano Coward, MD    D: 11/15/2017 9:55:25       T: 11/15/2017 10:29:32     [email or web address]  Job#: [id number]     Doc#: [id number]

## 2017-11-21 ENCOUNTER — HOSPITAL ENCOUNTER (OUTPATIENT)
Dept: PHYSICAL THERAPY | Age: 27
Discharge: HOME | End: 2017-11-21
Admitting: ORTHOPAEDIC SURGERY

## 2017-11-21 NOTE — FLOWSHEET NOTE
Outpatient Physical Therapy           [place]           [] Phone: [phone]   Fax: [phone]  [facility]           [] Phone: [phone]   Fax: [phone]    Physical Therapy Daily Treatment Note  Date:  2017    Patient Name:  Maty Newton    :  1990  MRN: [id number]  Restrictions/Precautions:   Diagnosis:  S/P scope L knee with lateral release    Date of Surgery:   Treatment Diagnosis:   s/p scope and lateral release L knee; weakness, swelling, pain, and decreased functional use of L knee    Insurance/Certification information:  Brielle 14  Referring Physician: Dr. Jessica Barrow    Next Doctor Visit:    Plan of care signed (Y/N):  Y  Visit# / total visits:     Pain level: /10       Goals:       Short term goals  Time Frame for Short term goals: 8 wks. Short term goal 1: Ind with HEP with good compliance Met - reports compliance   Short term goal 2: Normal gait pattern without assist device Progressing   Short term goal 3: Full active ROM Progressing   Short term goal 4: Return to normal day standing at work. Subjective:  Patient states knee is feeling a lot better. Patient states that she turns her foot outward when she walks. Was able to go up her stairs with step to pattern today with less pain, more just surgical soreness. Walking without AD but mild limp. Any changes in Ambulatory Summary Sheet? No       Objective:  AROM L knee Flexion 118°  Extension 7° hyperextension   Good quad set. 20 SLR without lag. Stiff with knee flexion. Reminders to avoid knee hyperextension with WB activities.         Exercises:  Exercise/Equipment 17 Date Date Date            Nu-step L2 5'      Quad set  20*5\"      SLR 20*      Bridges w/ball squeeze 20*      S/L clams GTB 20* ea      HS stretch 1'      Calf stretch  2*30\"      Calf raises 20* FR       Ant step up  6\" 20*      Wobble board balance a/p, s/s 30\" ea      Shuttle LP B  3C 20*

## 2017-11-28 ENCOUNTER — OFFICE VISIT (OUTPATIENT)
Dept: ORTHOPEDIC SURGERY | Age: 27
End: 2017-11-28

## 2017-11-28 VITALS — HEIGHT: 65 IN | BODY MASS INDEX: 41.99 KG/M2 | RESPIRATION RATE: 16 BRPM | WEIGHT: 252 LBS

## 2017-11-28 DIAGNOSIS — Z98.890 S/P ARTHROSCOPY OF LEFT KNEE: Primary | ICD-10-CM

## 2017-11-28 DIAGNOSIS — M25.362 PATELLAR INSTABILITY OF LEFT KNEE: ICD-10-CM

## 2017-11-28 PROCEDURE — 99024 POSTOP FOLLOW-UP VISIT: CPT | Performed by: PHYSICIAN ASSISTANT

## 2017-11-28 NOTE — LETTER
[facility]  [address]  [facility] [address]  Phone: [phone]  Fax: [phone]    Betty Elizabeth        November 28, 2017     Patient: Elana Brooks   YOB: 1990   Date of Visit: 11/28/2017       To Whom It May Concern: It is my medical opinion that Xuan Mejia is to continue continue light duty restrictions until 12/3/17. If you have any questions or concerns, please don't hesitate to call.     Sincerely,        Kristyn Ortega PA-C

## 2017-12-01 ENCOUNTER — HOSPITAL ENCOUNTER (OUTPATIENT)
Dept: OTHER | Age: 27
Discharge: HOME | End: 2017-12-31
Attending: ORTHOPAEDIC SURGERY | Admitting: ORTHOPAEDIC SURGERY

## 2017-12-01 ENCOUNTER — TELEPHONE (OUTPATIENT)
Dept: ORTHOPEDIC SURGERY | Age: 27
End: 2017-12-01

## 2017-12-01 NOTE — TELEPHONE ENCOUNTER
Pt called stating that since she has increased her activity her pain and swelling had increased. She and would like to request that her work restrictions be extended until 12/18/17. She states that she does not feel that she can at this point complete a 12 hour shift with no restrictions.

## 2018-01-01 ENCOUNTER — HOSPITAL ENCOUNTER (OUTPATIENT)
Dept: OTHER | Age: 28
Discharge: HOME | End: 2018-01-31
Attending: ORTHOPAEDIC SURGERY | Admitting: ORTHOPAEDIC SURGERY

## 2018-01-18 DIAGNOSIS — E06.3 HYPOTHYROIDISM DUE TO HASHIMOTO'S THYROIDITIS: ICD-10-CM

## 2018-01-18 DIAGNOSIS — E03.8 HYPOTHYROIDISM DUE TO HASHIMOTO'S THYROIDITIS: ICD-10-CM

## 2018-01-18 RX ORDER — LEVOTHYROXINE SODIUM 0.1 MG/1
100 TABLET ORAL DAILY
Qty: 90 TABLET | Refills: 0 | Status: SHIPPED | OUTPATIENT
Start: 2018-01-18 | End: 2018-03-27 | Stop reason: SDUPTHER

## 2018-02-26 ENCOUNTER — OFFICE VISIT (OUTPATIENT)
Dept: FAMILY MEDICINE CLINIC | Age: 28
End: 2018-02-26

## 2018-02-26 VITALS
RESPIRATION RATE: 14 BRPM | SYSTOLIC BLOOD PRESSURE: 128 MMHG | DIASTOLIC BLOOD PRESSURE: 76 MMHG | BODY MASS INDEX: 41.59 KG/M2 | WEIGHT: 249.6 LBS | HEART RATE: 105 BPM | OXYGEN SATURATION: 97 % | HEIGHT: 65 IN

## 2018-02-26 DIAGNOSIS — O10.911 PRE-EXISTING HYPERTENSION AFFECTING PREGNANCY IN FIRST TRIMESTER: ICD-10-CM

## 2018-02-26 DIAGNOSIS — O99.281 HYPOTHYROIDISM DURING PREGNANCY IN FIRST TRIMESTER: Primary | ICD-10-CM

## 2018-02-26 DIAGNOSIS — E03.9 HYPOTHYROIDISM DURING PREGNANCY IN FIRST TRIMESTER: Primary | ICD-10-CM

## 2018-02-26 DIAGNOSIS — G90.A POTS (POSTURAL ORTHOSTATIC TACHYCARDIA SYNDROME): ICD-10-CM

## 2018-02-26 LAB — TSH SERPL DL<=0.05 MIU/L-ACNC: 1.28 UIU/ML (ref 0.27–4.2)

## 2018-02-26 PROCEDURE — 36415 COLL VENOUS BLD VENIPUNCTURE: CPT | Performed by: FAMILY MEDICINE

## 2018-02-26 PROCEDURE — 99213 OFFICE O/P EST LOW 20 MIN: CPT | Performed by: FAMILY MEDICINE

## 2018-02-26 ASSESSMENT — PATIENT HEALTH QUESTIONNAIRE - PHQ9
1. LITTLE INTEREST OR PLEASURE IN DOING THINGS: 0
SUM OF ALL RESPONSES TO PHQ9 QUESTIONS 1 & 2: 0
2. FEELING DOWN, DEPRESSED OR HOPELESS: 0
SUM OF ALL RESPONSES TO PHQ QUESTIONS 1-9: 0

## 2018-02-26 NOTE — PROGRESS NOTES
TSH 3.56 08/08/2014         ROS: Pertinent items are noted in HPI. All other ROS negative     reports that she has never smoked. She has never used smokeless tobacco.   reports that she does not drink alcohol. Physical Exam   Nursing note reviewed  /76 (Site: Left Arm, Position: Sitting, Cuff Size: Large Adult)   Pulse 105   Resp 14   Ht 5' 5\" (1.651 m)   Wt 249 lb 9.6 oz (113.2 kg)   LMP 10/19/2017 (Approximate)   SpO2 97%   BMI 41.54 kg/m²   BP Readings from Last 3 Encounters:   02/26/18 128/76   11/15/17 124/72   11/13/17 138/77     Wt Readings from Last 3 Encounters:   02/26/18 249 lb 9.6 oz (113.2 kg)   11/28/17 252 lb (114.3 kg)   11/16/17 252 lb (114.3 kg)     General appearance: cooperative   Neck: supple, symmetrical, trachea midline  Lungs: clear to auscultation bilaterally  Heart: tachycardic but NAD, regular and rhythm, S1, S2 normal,  Abdomen:  bowel sounds normal and soft, non-tender  MSK no LE edema  Skin: Skin color, texture, turgor normal. No rashes or lesions  Neurologic: Grossly normal   Psych: Alert and oriented, appropriate affect.     Assessment and Plan: See above

## 2018-02-27 ENCOUNTER — TELEPHONE (OUTPATIENT)
Dept: FAMILY MEDICINE CLINIC | Age: 28
End: 2018-02-27

## 2018-02-27 DIAGNOSIS — E03.9 HYPOTHYROIDISM DURING PREGNANCY IN FIRST TRIMESTER: Primary | ICD-10-CM

## 2018-02-27 DIAGNOSIS — O99.281 HYPOTHYROIDISM DURING PREGNANCY IN FIRST TRIMESTER: Primary | ICD-10-CM

## 2018-02-27 NOTE — TELEPHONE ENCOUNTER
Please notify patient her TSH is range for pregnancy less than 2.5 and I would like for her to repeat the TSH every 4 weeks for the next 6 months to make sure it stays under 2.5 (standing order in EPIC)

## 2018-03-08 ENCOUNTER — HOSPITAL ENCOUNTER (OUTPATIENT)
Dept: OTHER | Age: 28
Discharge: HOME | End: 2018-03-08
Attending: OBSTETRICS & GYNECOLOGY | Admitting: OBSTETRICS & GYNECOLOGY

## 2018-03-10 LAB
CHLAMYDIA TRACHOMATIS AMPLIFIED DET: NEGATIVE
N GONORRHOEAE AMPLIFIED DET: NEGATIVE

## 2018-03-22 ENCOUNTER — TELEPHONE (OUTPATIENT)
Dept: FAMILY MEDICINE CLINIC | Age: 28
End: 2018-03-22

## 2018-03-22 ENCOUNTER — HOSPITAL ENCOUNTER (OUTPATIENT)
Dept: LAB | Age: 28
Discharge: HOME | End: 2018-03-22
Attending: OBSTETRICS & GYNECOLOGY | Admitting: OBSTETRICS & GYNECOLOGY

## 2018-03-22 DIAGNOSIS — O99.281 HYPOTHYROIDISM DURING PREGNANCY IN FIRST TRIMESTER: Primary | ICD-10-CM

## 2018-03-22 DIAGNOSIS — E03.9 HYPOTHYROIDISM DURING PREGNANCY IN FIRST TRIMESTER: Primary | ICD-10-CM

## 2018-03-22 LAB
ESTIMATED AVERAGE GLUCOSE: 111 MG/DL
GLUCOSE BLD-MCNC: 99 MG/DL (ref 70–99)
HBA1C MFR BLD: 5.5 % (ref 4.2–6.3)
HEPATITIS B SURFACE ANTIGEN: NON REACTIVE
HIV SCREEN: NON REACTIVE

## 2018-03-22 NOTE — TELEPHONE ENCOUNTER
Patient called and wanted to to know if you could add on her TSH blood test on the test that she had taken today, she had gone to [facility]. Please advise.

## 2018-03-23 LAB — TSH HIGH SENSITIVITY: 0.75 UIU/ML (ref 0.27–4.2)

## 2018-03-23 NOTE — TELEPHONE ENCOUNTER
Spoke to [facility] who states they do not have any labs for pt.  Labs drawn yesterday was from another physican

## 2018-03-24 LAB
BASOPHILS ABSOLUTE: 0.1 K/CU MM
BASOPHILS RELATIVE PERCENT: 0.6 % (ref 0–1)
DIFFERENTIAL TYPE: ABNORMAL
EOSINOPHILS ABSOLUTE: 0.1 K/CU MM
EOSINOPHILS RELATIVE PERCENT: 0.9 % (ref 0–3)
HCT VFR BLD CALC: 36.5 % (ref 37–47)
HEMOGLOBIN: 11.6 GM/DL (ref 12.5–16)
IMMATURE NEUTROPHIL %: 0.3 % (ref 0–0.43)
LYMPHOCYTES ABSOLUTE: 2.7 K/CU MM
LYMPHOCYTES RELATIVE PERCENT: 19.8 % (ref 24–44)
MCH RBC QN AUTO: 27.2 PG (ref 27–31)
MCHC RBC AUTO-ENTMCNC: 31.8 % (ref 32–36)
MCV RBC AUTO: 85.7 FL (ref 78–100)
MONOCYTES ABSOLUTE: 0.9 K/CU MM
MONOCYTES RELATIVE PERCENT: 6.3 % (ref 0–4)
NUCLEATED RBC %: 0 %
PDW BLD-RTO: 14.6 % (ref 11.7–14.9)
PLATELET # BLD: 376 K/CU MM (ref 140–440)
PMV BLD AUTO: 10 FL (ref 7.5–11.1)
RBC # BLD: 4.26 M/CU MM (ref 4.2–5.4)
RPR: NON REACTIVE
RUBELLA ANTIBODY IGG: 9.4
SEGMENTED NEUTROPHILS ABSOLUTE COUNT: 10 K/CU MM
SEGMENTED NEUTROPHILS RELATIVE PERCENT: 72.1 % (ref 36–66)
TOTAL IMMATURE NEUTOROPHIL: 0.04 K/CU MM
TOTAL NUCLEATED RBC: 0 K/CU MM
WBC # BLD: 13.9 K/CU MM (ref 4–10.5)

## 2018-03-26 ENCOUNTER — TELEPHONE (OUTPATIENT)
Dept: FAMILY MEDICINE CLINIC | Age: 28
End: 2018-03-26

## 2018-03-26 DIAGNOSIS — E03.8 HYPOTHYROIDISM DUE TO HASHIMOTO'S THYROIDITIS: ICD-10-CM

## 2018-03-26 DIAGNOSIS — E06.3 HYPOTHYROIDISM DUE TO HASHIMOTO'S THYROIDITIS: ICD-10-CM

## 2018-03-27 RX ORDER — LEVOTHYROXINE SODIUM 0.1 MG/1
100 TABLET ORAL DAILY
Qty: 90 TABLET | Refills: 3 | Status: SHIPPED | OUTPATIENT
Start: 2018-03-27 | End: 2018-03-29 | Stop reason: SDUPTHER

## 2018-06-22 ENCOUNTER — HOSPITAL ENCOUNTER (OUTPATIENT)
Dept: LAB | Age: 28
Discharge: HOME | End: 2018-06-22
Attending: OBSTETRICS & GYNECOLOGY | Admitting: OBSTETRICS & GYNECOLOGY

## 2018-06-22 LAB
AMOUNT GLUCOSE GIVEN: ABNORMAL GRAMS
GLUCOSE TOLERANCE TEST 1 HOUR: 198 MG/DL
HCT VFR BLD CALC: 31.8 % (ref 37–47)
HEMOGLOBIN: 9.8 GM/DL (ref 12.5–16)
MCH RBC QN AUTO: 26.3 PG (ref 27–31)
MCHC RBC AUTO-ENTMCNC: 30.8 % (ref 32–36)
MCV RBC AUTO: 85.3 FL (ref 78–100)
PDW BLD-RTO: 14.2 % (ref 11.7–14.9)
PLATELET # BLD: 370 K/CU MM (ref 140–440)
PMV BLD AUTO: 10 FL (ref 7.5–11.1)
RBC # BLD: 3.73 M/CU MM (ref 4.2–5.4)
WBC # BLD: 12.4 K/CU MM (ref 4–10.5)

## 2018-06-28 ENCOUNTER — HOSPITAL ENCOUNTER (OUTPATIENT)
Dept: LAB | Age: 28
Discharge: HOME | End: 2018-06-28
Attending: FAMILY MEDICINE | Admitting: FAMILY MEDICINE

## 2018-06-28 LAB — TSH HIGH SENSITIVITY: 0.82 UIU/ML (ref 0.27–4.2)

## 2018-07-17 ENCOUNTER — NURSE TRIAGE (OUTPATIENT)
Dept: OTHER | Facility: CLINIC | Age: 28
End: 2018-07-17

## 2018-07-18 PROBLEM — R20.0 FACIAL NUMBNESS: Status: ACTIVE | Noted: 2018-07-18

## 2018-08-09 PROBLEM — Z34.93 ENCOUNTER FOR PREGNANCY RELATED EXAMINATION IN THIRD TRIMESTER: Status: ACTIVE | Noted: 2018-08-09

## 2018-08-17 ENCOUNTER — HOSPITAL ENCOUNTER (OUTPATIENT)
Dept: GENERAL RADIOLOGY | Age: 28
Discharge: HOME | End: 2018-08-17
Attending: OBSTETRICS & GYNECOLOGY | Admitting: OBSTETRICS & GYNECOLOGY

## 2018-08-17 LAB
ALBUMIN SERPL-MCNC: 3.6 GM/DL (ref 3.4–5)
ALP BLD-CCNC: 120 IU/L (ref 40–128)
ALT SERPL-CCNC: 9 U/L (ref 10–40)
ANION GAP SERPL CALCULATED.3IONS-SCNC: 23 MMOL/L (ref 4–16)
AST SERPL-CCNC: 16 IU/L (ref 15–37)
BASOPHILS ABSOLUTE: 0.1 K/CU MM
BASOPHILS RELATIVE PERCENT: 0.3 % (ref 0–1)
BILIRUB SERPL-MCNC: 0.2 MG/DL (ref 0–1)
BUN BLDV-MCNC: 4 MG/DL (ref 6–23)
CALCIUM SERPL-MCNC: 9.6 MG/DL (ref 8.3–10.6)
CHLORIDE BLD-SCNC: 97 MMOL/L (ref 99–110)
CO2: 18 MMOL/L (ref 21–32)
CREAT SERPL-MCNC: 0.4 MG/DL (ref 0.6–1.1)
DIFFERENTIAL TYPE: ABNORMAL
EOSINOPHILS ABSOLUTE: 0.1 K/CU MM
EOSINOPHILS RELATIVE PERCENT: 0.9 % (ref 0–3)
GFR AFRICAN AMERICAN: >60 ML/MIN/1.73M2
GFR NON-AFRICAN AMERICAN: >60 ML/MIN/1.73M2
GLUCOSE BLD-MCNC: 156 MG/DL (ref 70–99)
HCT VFR BLD CALC: 37.8 % (ref 37–47)
HEMOGLOBIN: 11 GM/DL (ref 12.5–16)
IMMATURE NEUTROPHIL %: 0.8 % (ref 0–0.43)
LYMPHOCYTES ABSOLUTE: 2.6 K/CU MM
LYMPHOCYTES RELATIVE PERCENT: 17.2 % (ref 24–44)
MCH RBC QN AUTO: 25.9 PG (ref 27–31)
MCHC RBC AUTO-ENTMCNC: 29.1 % (ref 32–36)
MCV RBC AUTO: 88.9 FL (ref 78–100)
MONOCYTES ABSOLUTE: 0.9 K/CU MM
MONOCYTES RELATIVE PERCENT: 5.7 % (ref 0–4)
NUCLEATED RBC %: 0 %
PDW BLD-RTO: 15.9 % (ref 11.7–14.9)
PLATELET # BLD: 366 K/CU MM (ref 140–440)
PMV BLD AUTO: 10.1 FL (ref 7.5–11.1)
POTASSIUM SERPL-SCNC: 4 MMOL/L (ref 3.5–5.1)
RBC # BLD: 4.25 M/CU MM (ref 4.2–5.4)
SEGMENTED NEUTROPHILS ABSOLUTE COUNT: 11.3 K/CU MM
SEGMENTED NEUTROPHILS RELATIVE PERCENT: 75.1 % (ref 36–66)
SODIUM BLD-SCNC: 138 MMOL/L (ref 135–145)
TOTAL IMMATURE NEUTOROPHIL: 0.12 K/CU MM
TOTAL NUCLEATED RBC: 0 K/CU MM
TOTAL PROTEIN: 6 GM/DL (ref 6.4–8.2)
TSH HIGH SENSITIVITY: 0.95 UIU/ML (ref 0.27–4.2)
URIC ACID: 4.6 MG/DL (ref 2.6–6)
WBC # BLD: 15.1 K/CU MM (ref 4–10.5)

## 2018-08-20 ENCOUNTER — HOSPITAL ENCOUNTER (OUTPATIENT)
Dept: OTHER | Age: 28
Discharge: HOME | End: 2018-08-20
Attending: OBSTETRICS & GYNECOLOGY | Admitting: OBSTETRICS & GYNECOLOGY

## 2018-08-24 LAB
CULTURE: NORMAL
Lab: NORMAL
REPORT STATUS: NORMAL
SPECIMEN: NORMAL

## 2018-08-31 PROBLEM — Z34.93 ENCOUNTER FOR PREGNANCY RELATED EXAMINATION, THIRD TRIMESTER: Status: ACTIVE | Noted: 2018-08-31

## 2018-11-28 DIAGNOSIS — O99.281 HYPOTHYROIDISM DURING PREGNANCY IN FIRST TRIMESTER: ICD-10-CM

## 2018-11-28 DIAGNOSIS — E03.9 HYPOTHYROIDISM DURING PREGNANCY IN FIRST TRIMESTER: ICD-10-CM

## 2018-11-29 RX ORDER — LEVOTHYROXINE SODIUM 112 UG/1
TABLET ORAL
Qty: 90 TABLET | Refills: 1 | Status: SHIPPED | OUTPATIENT
Start: 2018-11-29 | End: 2019-03-11 | Stop reason: SDUPTHER

## 2018-12-05 ENCOUNTER — OFFICE VISIT (OUTPATIENT)
Dept: FAMILY MEDICINE CLINIC | Age: 28
End: 2018-12-05
Payer: COMMERCIAL

## 2018-12-05 VITALS
HEIGHT: 65 IN | OXYGEN SATURATION: 98 % | SYSTOLIC BLOOD PRESSURE: 110 MMHG | HEART RATE: 93 BPM | DIASTOLIC BLOOD PRESSURE: 72 MMHG | TEMPERATURE: 98.3 F | WEIGHT: 234.4 LBS | BODY MASS INDEX: 39.05 KG/M2

## 2018-12-05 DIAGNOSIS — R68.89 FLU-LIKE SYMPTOMS: ICD-10-CM

## 2018-12-05 DIAGNOSIS — H65.193 ACUTE MIDDLE EAR EFFUSION, BILATERAL: Primary | ICD-10-CM

## 2018-12-05 DIAGNOSIS — J02.9 SORE THROAT: ICD-10-CM

## 2018-12-05 PROBLEM — Z34.93 ENCOUNTER FOR PREGNANCY RELATED EXAMINATION IN THIRD TRIMESTER: Status: RESOLVED | Noted: 2018-08-09 | Resolved: 2018-12-05

## 2018-12-05 PROBLEM — Z34.93 ENCOUNTER FOR PREGNANCY RELATED EXAMINATION, THIRD TRIMESTER: Status: RESOLVED | Noted: 2018-08-31 | Resolved: 2018-12-05

## 2018-12-05 LAB
INFLUENZA VIRUS A RNA: NEGATIVE
INFLUENZA VIRUS B RNA: NEGATIVE

## 2018-12-05 PROCEDURE — 87502 INFLUENZA DNA AMP PROBE: CPT | Performed by: NURSE PRACTITIONER

## 2018-12-05 PROCEDURE — 99213 OFFICE O/P EST LOW 20 MIN: CPT | Performed by: NURSE PRACTITIONER

## 2018-12-05 RX ORDER — BENZONATATE 100 MG/1
100 CAPSULE ORAL 3 TIMES DAILY PRN
Qty: 21 CAPSULE | Refills: 0 | Status: SHIPPED | OUTPATIENT
Start: 2018-12-05 | End: 2018-12-12

## 2018-12-05 RX ORDER — PREDNISONE 20 MG/1
40 TABLET ORAL DAILY
Qty: 10 TABLET | Refills: 0 | Status: SHIPPED | OUTPATIENT
Start: 2018-12-05 | End: 2018-12-10

## 2018-12-05 RX ORDER — AMOXICILLIN AND CLAVULANATE POTASSIUM 875; 125 MG/1; MG/1
1 TABLET, FILM COATED ORAL 2 TIMES DAILY
Qty: 20 TABLET | Refills: 0 | Status: SHIPPED | OUTPATIENT
Start: 2018-12-05 | End: 2018-12-15

## 2018-12-05 RX ORDER — FLUTICASONE PROPIONATE 50 MCG
2 SPRAY, SUSPENSION (ML) NASAL DAILY
Qty: 1 BOTTLE | Refills: 0 | Status: SHIPPED | OUTPATIENT
Start: 2018-12-05 | End: 2019-03-11

## 2018-12-05 ASSESSMENT — ENCOUNTER SYMPTOMS
EYES NEGATIVE: 1
SWOLLEN GLANDS: 1
SHORTNESS OF BREATH: 0
FLU SYMPTOMS: 1
CHEST TIGHTNESS: 0
COUGH: 1
SINUS PRESSURE: 1
SORE THROAT: 1
WHEEZING: 0

## 2018-12-05 NOTE — PROGRESS NOTES
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 Symptoms began two days ago and have progressed since. Influenza   This is a new problem. The current episode started in the past 7 days (2 days ago). The problem occurs constantly. The problem has been gradually worsening. Associated symptoms include anorexia, chills, congestion, coughing, diaphoresis, fatigue, a fever, headaches, myalgias, a sore throat and swollen glands. The symptoms are aggravated by exertion. She has tried NSAIDs, acetaminophen, rest and sleep for the symptoms. The treatment provided mild relief. Sinusitis   This is a new problem. The current episode started in the past 7 days. The problem has been gradually worsening since onset. The maximum temperature recorded prior to her arrival was 101 - 101.9 F. The fever has been present for 1 to 2 days. The pain is moderate. Associated symptoms include chills, congestion, coughing, diaphoresis, ear pain (feels fluid shift with forward flexion), headaches, sinus pressure ('I can feel it in my teeth'), a sore throat and swollen glands. Pertinent negatives include no shortness of breath. Treatments tried: Cold/Flu OTC med. Pharyngitis   This is a new problem. The current episode started in the past 7 days. The problem occurs constantly. The problem has been gradually worsening. Associated symptoms include anorexia, chills, congestion, coughing, diaphoresis, fatigue, a fever, headaches, myalgias, a sore throat and swollen glands. The symptoms are aggravated by drinking, eating and swallowing. She has tried NSAIDs, acetaminophen and rest for the symptoms. The treatment provided mild relief.        Past Medical History:   Diagnosis Date    Accommodative esotropia     Eye specialst in [place],   Worse in Pulse 93   Temp 98.3 °F (36.8 °C)   Ht 5' 4.5\" (1.638 m)   Wt 234 lb 6.4 oz (106.3 kg)   LMP 11/14/2018 (Approximate)   SpO2 98%   Breastfeeding? Unknown   BMI 39.61 kg/m²     Physical Exam   Constitutional: She is oriented to person, place, and time. She appears well-developed and well-nourished. HENT:   Head: Normocephalic. Right Ear: External ear and ear canal normal. Tympanic membrane is erythematous. A middle ear effusion is present. Left Ear: External ear and ear canal normal. Tympanic membrane is erythematous. A middle ear effusion is present. Nose: Mucosal edema present. Right sinus exhibits maxillary sinus tenderness. Right sinus exhibits no frontal sinus tenderness. Left sinus exhibits maxillary sinus tenderness. Left sinus exhibits no frontal sinus tenderness. Mouth/Throat: Posterior oropharyngeal edema and posterior oropharyngeal erythema present. No oropharyngeal exudate. Tonsils are 2+ on the right. Tonsils are 2+ on the left. Eyes: Pupils are equal, round, and reactive to light. Conjunctivae are normal.   Neck: Neck supple. Cardiovascular: Normal rate, regular rhythm and normal heart sounds. Pulmonary/Chest: Effort normal and breath sounds normal.   Lymphadenopathy:     She has no cervical adenopathy. Neurological: She is alert and oriented to person, place, and time. Skin: Skin is warm and dry. No results found for requested labs within last 30 days.      Hemoglobin A1C (%)   Date Value   03/22/2018 5.5     LDL Calculated (MG/DL)   Date Value   06/13/2017 164 (H)       Lab Results   Component Value Date    WBC 11.4 09/01/2018    WBC 11.4 08/31/2018    WBC 15.1 08/17/2018    HGB 10.3 09/01/2018    HGB 12.0 08/31/2018    HGB 11.0 08/17/2018    HCT 34.7 09/01/2018    HCT 37.6 08/31/2018    HCT 37.8 08/17/2018    MCV 86.8 09/01/2018    MCV 84.5 08/31/2018    MCV 88.9 08/17/2018     09/01/2018     08/31/2018     08/17/2018    SEGSABS 11.3 08/17/2018

## 2018-12-08 ENCOUNTER — OFFICE VISIT (OUTPATIENT)
Dept: FAMILY MEDICINE CLINIC | Age: 28
End: 2018-12-08
Payer: COMMERCIAL

## 2018-12-08 VITALS
HEART RATE: 96 BPM | BODY MASS INDEX: 40.12 KG/M2 | TEMPERATURE: 97.8 F | DIASTOLIC BLOOD PRESSURE: 90 MMHG | SYSTOLIC BLOOD PRESSURE: 128 MMHG | OXYGEN SATURATION: 99 % | RESPIRATION RATE: 16 BRPM | HEIGHT: 64 IN | WEIGHT: 235 LBS

## 2018-12-08 DIAGNOSIS — J01.90 ACUTE NON-RECURRENT SINUSITIS, UNSPECIFIED LOCATION: ICD-10-CM

## 2018-12-08 DIAGNOSIS — J04.0 LARYNGITIS: ICD-10-CM

## 2018-12-08 DIAGNOSIS — H65.03 BILATERAL ACUTE SEROUS OTITIS MEDIA, RECURRENCE NOT SPECIFIED: Primary | ICD-10-CM

## 2018-12-08 PROCEDURE — 99213 OFFICE O/P EST LOW 20 MIN: CPT | Performed by: NURSE PRACTITIONER

## 2018-12-08 RX ORDER — OFLOXACIN 3 MG/ML
3 SOLUTION AURICULAR (OTIC) DAILY
Qty: 10 ML | Refills: 0 | Status: SHIPPED | OUTPATIENT
Start: 2018-12-08 | End: 2018-12-15

## 2018-12-08 ASSESSMENT — ENCOUNTER SYMPTOMS
DIARRHEA: 0
WHEEZING: 0
COUGH: 1
TROUBLE SWALLOWING: 0
VOMITING: 0
CHEST TIGHTNESS: 0
EYES NEGATIVE: 1
SHORTNESS OF BREATH: 0
RHINORRHEA: 1
SINUS PRESSURE: 1
NAUSEA: 0
ABDOMINAL PAIN: 0
SORE THROAT: 1

## 2018-12-08 NOTE — PROGRESS NOTES
Jason Rojas is a 33 y.o. female. Chief Complaint   Patient presents with    Otalgia        SUBJECTIVE:     Anthony Whitaker is an established pt of Dr. Sade Stokes who presents with persistent bilateral ear pain, L>R. She saw OPHELIA Bowman on 12/5 for ear pain. Serous effusion at that time and plan was watchful waiting over the next few days and to start augmentin in no significant improvement in that timeframe. She thinks that eardrum may have ruptured in the last 24hrs as Thurs kelly she had sharp pain that suddenly improved and then noted ringing, clogging, and decreased hearing. She had a cottonball in left ear yesterday at work that had watery drainage on it when she took it out. Otalgia    There is pain in both (L>R) ears. This is a new problem. The current episode started in the past 7 days. The problem occurs constantly. The problem has been gradually worsening. There has been no fever (fever when symptoms initially started about 6 days ago). Pain scale: minimal pain to left ear now. The pain is mild. Associated symptoms include coughing, ear discharge (watery), hearing loss (left), neck pain (left anterior), rhinorrhea and a sore throat. Pertinent negatives include no abdominal pain, diarrhea, headaches, rash or vomiting. She has tried antibiotics (started augmentin 24hrs ago) for the symptoms. The treatment provided mild relief. There is no history of a chronic ear infection, hearing loss or a tympanostomy tube. Left sided sinus pressure with nasal drainage and associated productive cough. Lost voice in the last 1-2 days. Of note- she just returned to work from maternity leave and has 2 young children at home. Review of Systems   Constitutional: Positive for fatigue. Negative for activity change, appetite change, chills, diaphoresis and fever.    HENT: Positive for congestion, ear discharge (watery), ear pain (has improved in the last 24hrs), hearing loss (left), postnasal drip, No murmur heard. Pulmonary/Chest: Effort normal and breath sounds normal. No respiratory distress. She has no wheezes. She has no rales. She exhibits no tenderness. Musculoskeletal: Normal range of motion. Lymphadenopathy:     She has no cervical adenopathy. Neurological: She is alert and oriented to person, place, and time. Skin: Skin is warm. No rash noted. She is not diaphoretic. Psychiatric: She has a normal mood and affect. Her behavior is normal.   Nursing note and vitals reviewed. ASSESSMENT/PLAN:     Jj Lincoln was seen today for otalgia. Diagnoses and all orders for this visit:    Bilateral acute serous otitis media, recurrence not specified  -     ofloxacin (FLOXIN OTIC) 0.3 % otic solution; Place 3 drops into the left ear daily for 7 days  -Questionable perforation to left TM-> I presume the irregularity at the lower border of the TM is from a perforation that is healing/sealing as there looks to be a thin membrane covering the abnormality. Will add floxin otic gtts. -D/w pt the following: Antibiotic drops to left ear. Water precautions--avoid getting any water in your ear. When showering, protect inside of ear from water by placing cottonball at opening of ear. When applying ear drops: Lie in a semi-recumbent position and turn your head to the side of the unaffected ear. Place the drops in the affected ear. Stay in this position for 5 min for better absorption. If you cannot lie down for 5min after administering ear drops then place cottonball at opening of ear after placing ear drops for better absorption. Leave cottonball in place for 20min. May use ibuprofen or Tylenol as needed for pain. Continue on augmentin twice daily, take full course. Antibiotics disrupt the gut microflora, sometimes permanently, which can have negative implications now (abdominal pain, diarrhea, nausea, vomiting, yeast overgrowth) or in the future (chronic disease).  Recommend yogurt (or kefir) and refrigerated probiotic (these can be found online, [employer], or [employer]) for the duration of antibiotic therapy and several weeks to months following cessation. Goal is to replenish the beneficial bacteria in the gut that was lost during treatment with antibiotics. Laryngitis- FLUIDS, voice rest I.e.no or limited talking, continue prednisone. To support immune system: Cold-eeze (zinc gluconate lozenges)-> dissolve 1 lozenge in mouth every 2-4 hrs while awake which can be helpful in supporting your immune system in fighting cold. Vitamin C--especially beneficial in preventing colds. Acute non-recurrent sinusitis, unspecified location         -See above. Laryngitis          -See above. Return if symptoms worsen or fail to improve. Pt voices understanding and is agreeable to plan.      Current Outpatient Prescriptions   Medication Sig Dispense Refill    ofloxacin (FLOXIN OTIC) 0.3 % otic solution Place 3 drops into the left ear daily for 7 days 10 mL 0    norethindrone (AYGESTIN) 5 MG tablet Take 5 mg by mouth daily      amoxicillin-clavulanate (AUGMENTIN) 875-125 MG per tablet Take 1 tablet by mouth 2 times daily for 10 days 20 tablet 0    predniSONE (DELTASONE) 20 MG tablet Take 2 tablets by mouth daily for 5 days 10 tablet 0    benzonatate (TESSALON PERLES) 100 MG capsule Take 1 capsule by mouth 3 times daily as needed for Cough 21 capsule 0    Magic Mouthwash (MIRACLE MOUTHWASH) Swish and spit 5 mLs 4 times daily as needed for Irritation 120 mL 0    fluticasone (FLONASE) 50 MCG/ACT nasal spray 2 sprays by Each Nare route daily 1 Bottle 0    levothyroxine (SYNTHROID) 112 MCG tablet TAKE ONE TABLET BY MOUTH ONE TIME A DAY 90 tablet 1    ibuprofen (ADVIL;MOTRIN) 800 MG tablet Take 1 tablet by mouth every 8 hours 120 tablet 3    acetaminophen (TYLENOL) 325 MG tablet Take 650 mg by mouth every 6 hours as needed for Pain      metoprolol tartrate (LOPRESSOR) 50 MG tablet Take 50 mg by

## 2018-12-17 ENCOUNTER — OFFICE VISIT (OUTPATIENT)
Dept: FAMILY MEDICINE CLINIC | Age: 28
End: 2018-12-17
Payer: COMMERCIAL

## 2018-12-17 VITALS
OXYGEN SATURATION: 97 % | DIASTOLIC BLOOD PRESSURE: 68 MMHG | SYSTOLIC BLOOD PRESSURE: 112 MMHG | TEMPERATURE: 98.3 F | HEART RATE: 87 BPM | HEIGHT: 64 IN | BODY MASS INDEX: 40.74 KG/M2 | WEIGHT: 238.6 LBS

## 2018-12-17 DIAGNOSIS — J06.9 UPPER RESPIRATORY TRACT INFECTION, UNSPECIFIED TYPE: Primary | ICD-10-CM

## 2018-12-17 DIAGNOSIS — H65.92 MIDDLE EAR EFFUSION, LEFT: ICD-10-CM

## 2018-12-17 DIAGNOSIS — H90.12 CONDUCTIVE HEARING LOSS OF LEFT EAR WITH UNRESTRICTED HEARING OF RIGHT EAR: ICD-10-CM

## 2018-12-17 PROCEDURE — 99213 OFFICE O/P EST LOW 20 MIN: CPT | Performed by: FAMILY MEDICINE

## 2018-12-17 NOTE — LETTER
December 17, 2018     Patient: Emilia Rueda   YOB: 1990   Date of Visit: 12/17/2018       To Whom It May Concern:      She was seen in our office this morning. It is my medical opinion that Blanquita Lizama may return to full duty immediately with no restrictions. If you have any questions or concerns, please don't hesitate to call. Sincerely,          Raaf Higuera M.D., M.P.H  [facility] [place] [address] [id number]  [facility], [address] [facility]  Phone: [phone]  Fax: [phone]

## 2019-03-11 ENCOUNTER — OFFICE VISIT (OUTPATIENT)
Dept: FAMILY MEDICINE CLINIC | Age: 29
End: 2019-03-11
Payer: COMMERCIAL

## 2019-03-11 VITALS
HEART RATE: 88 BPM | OXYGEN SATURATION: 98 % | DIASTOLIC BLOOD PRESSURE: 76 MMHG | WEIGHT: 243.6 LBS | SYSTOLIC BLOOD PRESSURE: 124 MMHG | HEIGHT: 64 IN | BODY MASS INDEX: 41.59 KG/M2

## 2019-03-11 DIAGNOSIS — E04.1 RIGHT THYROID NODULE: ICD-10-CM

## 2019-03-11 DIAGNOSIS — Z00.00 WELL ADULT EXAM: Primary | ICD-10-CM

## 2019-03-11 DIAGNOSIS — E03.9 ACQUIRED HYPOTHYROIDISM: ICD-10-CM

## 2019-03-11 PROBLEM — O99.281 HYPOTHYROIDISM DURING PREGNANCY IN FIRST TRIMESTER: Status: RESOLVED | Noted: 2018-02-26 | Resolved: 2019-03-11

## 2019-03-11 PROBLEM — Z96.22: Status: ACTIVE | Noted: 2019-03-11

## 2019-03-11 LAB — TSH SERPL DL<=0.05 MIU/L-ACNC: 2.89 UIU/ML (ref 0.27–4.2)

## 2019-03-11 PROCEDURE — 99395 PREV VISIT EST AGE 18-39: CPT | Performed by: FAMILY MEDICINE

## 2019-03-11 PROCEDURE — 36415 COLL VENOUS BLD VENIPUNCTURE: CPT | Performed by: FAMILY MEDICINE

## 2019-03-11 RX ORDER — LEVOTHYROXINE SODIUM 112 UG/1
112 TABLET ORAL
Qty: 90 TABLET | Refills: 3 | Status: SHIPPED | OUTPATIENT
Start: 2019-03-11

## 2019-03-11 ASSESSMENT — PATIENT HEALTH QUESTIONNAIRE - PHQ9
SUM OF ALL RESPONSES TO PHQ QUESTIONS 1-9: 0
2. FEELING DOWN, DEPRESSED OR HOPELESS: 0
1. LITTLE INTEREST OR PLEASURE IN DOING THINGS: 0
SUM OF ALL RESPONSES TO PHQ QUESTIONS 1-9: 0
SUM OF ALL RESPONSES TO PHQ9 QUESTIONS 1 & 2: 0